# Patient Record
Sex: MALE | Race: WHITE | Employment: OTHER | URBAN - NONMETROPOLITAN AREA
[De-identification: names, ages, dates, MRNs, and addresses within clinical notes are randomized per-mention and may not be internally consistent; named-entity substitution may affect disease eponyms.]

---

## 2017-04-11 ENCOUNTER — OFFICE VISIT (OUTPATIENT)
Dept: GASTROENTEROLOGY | Age: 72
End: 2017-04-11
Payer: MEDICARE

## 2017-04-11 VITALS
SYSTOLIC BLOOD PRESSURE: 132 MMHG | HEIGHT: 68 IN | HEART RATE: 65 BPM | OXYGEN SATURATION: 99 % | WEIGHT: 144.8 LBS | BODY MASS INDEX: 21.95 KG/M2 | DIASTOLIC BLOOD PRESSURE: 84 MMHG

## 2017-04-11 DIAGNOSIS — Z86.010 HISTORY OF ADENOMATOUS POLYP OF COLON: Primary | ICD-10-CM

## 2017-04-11 DIAGNOSIS — Z83.71 FAMILY HISTORY OF COLONIC POLYPS: ICD-10-CM

## 2017-04-11 PROCEDURE — 3017F COLORECTAL CA SCREEN DOC REV: CPT | Performed by: NURSE PRACTITIONER

## 2017-04-11 PROCEDURE — 99214 OFFICE O/P EST MOD 30 MIN: CPT | Performed by: NURSE PRACTITIONER

## 2017-04-11 PROCEDURE — G8419 CALC BMI OUT NRM PARAM NOF/U: HCPCS | Performed by: NURSE PRACTITIONER

## 2017-04-11 PROCEDURE — 1123F ACP DISCUSS/DSCN MKR DOCD: CPT | Performed by: NURSE PRACTITIONER

## 2017-04-11 PROCEDURE — G8427 DOCREV CUR MEDS BY ELIG CLIN: HCPCS | Performed by: NURSE PRACTITIONER

## 2017-04-11 PROCEDURE — 4004F PT TOBACCO SCREEN RCVD TLK: CPT | Performed by: NURSE PRACTITIONER

## 2017-04-11 PROCEDURE — 4040F PNEUMOC VAC/ADMIN/RCVD: CPT | Performed by: NURSE PRACTITIONER

## 2017-04-11 ASSESSMENT — ENCOUNTER SYMPTOMS
DIARRHEA: 0
BACK PAIN: 0
RECTAL PAIN: 0
NAUSEA: 0
SHORTNESS OF BREATH: 0
ABDOMINAL DISTENTION: 0
COUGH: 0
ABDOMINAL PAIN: 0
SORE THROAT: 0
BLOOD IN STOOL: 0
VOICE CHANGE: 0
VOMITING: 0
CONSTIPATION: 0
CHEST TIGHTNESS: 0

## 2017-05-05 ENCOUNTER — ANESTHESIA EVENT (OUTPATIENT)
Dept: OPERATING ROOM | Age: 72
End: 2017-05-05

## 2017-05-09 ENCOUNTER — ANESTHESIA (OUTPATIENT)
Dept: OPERATING ROOM | Age: 72
End: 2017-05-09
Payer: MEDICARE

## 2017-05-09 ENCOUNTER — HOSPITAL ENCOUNTER (OUTPATIENT)
Age: 72
Setting detail: OUTPATIENT SURGERY
Discharge: HOME | End: 2017-05-09
Attending: INTERNAL MEDICINE | Admitting: INTERNAL MEDICINE
Payer: MEDICARE

## 2017-05-09 VITALS — DIASTOLIC BLOOD PRESSURE: 64 MMHG | OXYGEN SATURATION: 99 % | SYSTOLIC BLOOD PRESSURE: 98 MMHG

## 2017-05-09 VITALS
TEMPERATURE: 97 F | OXYGEN SATURATION: 98 % | BODY MASS INDEX: 21.22 KG/M2 | WEIGHT: 140 LBS | RESPIRATION RATE: 18 BRPM | DIASTOLIC BLOOD PRESSURE: 73 MMHG | SYSTOLIC BLOOD PRESSURE: 112 MMHG | HEIGHT: 68 IN | HEART RATE: 70 BPM

## 2017-05-09 PROCEDURE — G0105 COLORECTAL SCRN; HI RISK IND: HCPCS | Performed by: INTERNAL MEDICINE

## 2017-05-09 PROCEDURE — G8907 PT DOC NO EVENTS ON DISCHARG: HCPCS

## 2017-05-09 PROCEDURE — G8918 PT W/O PREOP ORDER IV AB PRO: HCPCS

## 2017-05-09 PROCEDURE — 00810 PR ANESTH,INTESTINE,SCOPE,LOW: CPT | Performed by: NURSE ANESTHETIST, CERTIFIED REGISTERED

## 2017-05-09 PROCEDURE — G0105 COLORECTAL SCRN; HI RISK IND: HCPCS

## 2017-05-09 RX ORDER — LIDOCAINE HYDROCHLORIDE 10 MG/ML
1 INJECTION, SOLUTION EPIDURAL; INFILTRATION; INTRACAUDAL; PERINEURAL
Status: DISCONTINUED | OUTPATIENT
Start: 2017-05-09 | End: 2017-05-09 | Stop reason: HOSPADM

## 2017-05-09 RX ORDER — PROPOFOL 10 MG/ML
INJECTION, EMULSION INTRAVENOUS PRN
Status: DISCONTINUED | OUTPATIENT
Start: 2017-05-09 | End: 2017-05-09 | Stop reason: SDUPTHER

## 2017-05-09 RX ORDER — SODIUM CHLORIDE, SODIUM LACTATE, POTASSIUM CHLORIDE, CALCIUM CHLORIDE 600; 310; 30; 20 MG/100ML; MG/100ML; MG/100ML; MG/100ML
INJECTION, SOLUTION INTRAVENOUS CONTINUOUS PRN
Status: DISCONTINUED | OUTPATIENT
Start: 2017-05-09 | End: 2017-05-09 | Stop reason: SDUPTHER

## 2017-05-09 RX ORDER — SODIUM CHLORIDE, SODIUM LACTATE, POTASSIUM CHLORIDE, CALCIUM CHLORIDE 600; 310; 30; 20 MG/100ML; MG/100ML; MG/100ML; MG/100ML
INJECTION, SOLUTION INTRAVENOUS CONTINUOUS
Status: DISCONTINUED | OUTPATIENT
Start: 2017-05-09 | End: 2017-05-09 | Stop reason: HOSPADM

## 2017-05-09 RX ORDER — LIDOCAINE HYDROCHLORIDE 20 MG/ML
INJECTION, SOLUTION EPIDURAL; INFILTRATION; INTRACAUDAL; PERINEURAL PRN
Status: DISCONTINUED | OUTPATIENT
Start: 2017-05-09 | End: 2017-05-09 | Stop reason: SDUPTHER

## 2017-05-09 RX ADMIN — SODIUM CHLORIDE, SODIUM LACTATE, POTASSIUM CHLORIDE, CALCIUM CHLORIDE: 600; 310; 30; 20 INJECTION, SOLUTION INTRAVENOUS at 08:58

## 2017-05-09 RX ADMIN — PROPOFOL 200 MG: 10 INJECTION, EMULSION INTRAVENOUS at 09:04

## 2017-05-09 RX ADMIN — LIDOCAINE HYDROCHLORIDE 50 MG: 20 INJECTION, SOLUTION EPIDURAL; INFILTRATION; INTRACAUDAL; PERINEURAL at 09:01

## 2017-05-09 RX ADMIN — SODIUM CHLORIDE, SODIUM LACTATE, POTASSIUM CHLORIDE, CALCIUM CHLORIDE: 600; 310; 30; 20 INJECTION, SOLUTION INTRAVENOUS at 08:54

## 2017-06-05 ENCOUNTER — OFFICE VISIT (OUTPATIENT)
Dept: UROLOGY | Facility: CLINIC | Age: 72
End: 2017-06-05

## 2017-06-05 VITALS
HEIGHT: 68 IN | WEIGHT: 143 LBS | DIASTOLIC BLOOD PRESSURE: 82 MMHG | SYSTOLIC BLOOD PRESSURE: 160 MMHG | BODY MASS INDEX: 21.67 KG/M2 | TEMPERATURE: 97.8 F

## 2017-06-05 DIAGNOSIS — N40.1 BENIGN NON-NODULAR PROSTATIC HYPERPLASIA WITH LOWER URINARY TRACT SYMPTOMS: ICD-10-CM

## 2017-06-05 DIAGNOSIS — R97.20 ELEVATED PROSTATE SPECIFIC ANTIGEN (PSA): Primary | ICD-10-CM

## 2017-06-05 LAB
BILIRUB BLD-MCNC: NEGATIVE MG/DL
CLARITY, POC: CLEAR
COLOR UR: YELLOW
GLUCOSE UR STRIP-MCNC: NEGATIVE MG/DL
KETONES UR QL: NEGATIVE
LEUKOCYTE EST, POC: NEGATIVE
NITRITE UR-MCNC: NEGATIVE MG/ML
PH UR: 7 [PH] (ref 5–8)
PROT UR STRIP-MCNC: NEGATIVE MG/DL
RBC # UR STRIP: NEGATIVE /UL
SP GR UR: 1.01 (ref 1–1.03)
UROBILINOGEN UR QL: NORMAL

## 2017-06-05 PROCEDURE — 99214 OFFICE O/P EST MOD 30 MIN: CPT | Performed by: UROLOGY

## 2017-06-05 PROCEDURE — 81003 URINALYSIS AUTO W/O SCOPE: CPT | Performed by: UROLOGY

## 2017-06-05 NOTE — PROGRESS NOTES
"  Mr. Ramirez is 71 y.o. male    CHIEF COMPLAINT: My Psa went back up    HPI  Elevated PSA  The patient presents with a presumed abnormality of the prostate gland, that is an elevated PSA.  This has been found in the context of PSA screening. Severity is best defined by the PSA level of 8.3 ng/ml. This test was done approximately 6days ago. Associated voiding symptom assessment reveals Intermittency. He is undergone a standard 12 core template biopsy that was negative..       Previous PSA values include :   No results found for: PSA    The following portions of the patient's history were reviewed and updated as appropriate: allergies, current medications, past family history, past medical history, past social history, past surgical history and problem list.    Review of Systems   Constitutional: Negative for chills and fever.   Gastrointestinal: Negative for abdominal pain, anal bleeding and blood in stool.   Genitourinary: Negative for difficulty urinating, flank pain, frequency, hematuria and urgency.       No current outpatient prescriptions on file.    Past Medical History:   Diagnosis Date   • Elevated PSA        Past Surgical History:   Procedure Laterality Date   • FOOT SURGERY     • TONSILLECTOMY         Social History     Social History   • Marital status: Unknown     Spouse name: N/A   • Number of children: N/A   • Years of education: N/A     Social History Main Topics   • Smoking status: Never Smoker   • Smokeless tobacco: None   • Alcohol use No   • Drug use: None   • Sexual activity: Not Asked     Other Topics Concern   • None     Social History Narrative   • None       Family History   Problem Relation Age of Onset   • No Known Problems Father    • No Known Problems Mother        /82  Temp 97.8 °F (36.6 °C)  Ht 68\" (172.7 cm)  Wt 143 lb (64.9 kg)  BMI 21.74 kg/m2    Physical Exam  Constitutional: He is oriented to person, place, and time. He appears well-developed and well-nourished. "   Pulmonary/Chest: Effort normal.   Abdominal: Soft. He exhibits no distension and no mass. There is no tenderness. There is no rebound and no guarding. No hernia.   Genitourinary: Penis normal. Rectal exam shows no mass, no tenderness and anal tone normal. Enlarged: for the age of the patient. Right testis shows no mass, no swelling and no tenderness. Left testis shows no mass, no swelling and no tenderness. No hypospadias. No discharge found.   Genitourinary Comments:  The urethral meatus normal in position without evidence of stricture. Epididymis without mass or tenderness. Vas Deferens is palpably normal.Anus and perineum without mass or tenderness. The prostate is approximately 30 ml. It is Symmetric, with a little firm consistency. There are no nodules present. . The seminal vesicles are Palpably normal in size and without mass.   Neurological: He is alert and oriented to person, place, and time.   Vitals reviewed.        Results for orders placed or performed in visit on 06/05/17   POC Urinalysis Dipstick, Automated   Result Value Ref Range    Color Yellow Yellow, Straw, Dark Yellow, Ava    Clarity, UA Clear Clear    Glucose, UA Negative Negative, 1000 mg/dL (3+) mg/dL    Bilirubin Negative Negative    Ketones, UA Negative Negative    Specific Gravity  1.015 1.005 - 1.030    Blood, UA Negative Negative    pH, Urine 7.0 5.0 - 8.0    Protein, POC Negative Negative mg/dL    Urobilinogen, UA Normal Normal    Leukocytes Negative Negative    Nitrite, UA Negative Negative       Imaging Results (last 7 days)     ** No results found for the last 168 hours. **          Assessment and Plan  Gerard was seen today for elevated psa.    Diagnoses and all orders for this visit:    Elevated prostate specific antigen (PSA)  -     POC Urinalysis Dipstick, Automated  -     Cancel: PSA; Future  -     PSA, Total & Free; Future    Benign non-nodular prostatic hyperplasia with lower urinary tract symptoms    This represents an  undiagnosed problem with uncertain prognosis.  I discussed elevated PSA with the patient today.  We discussed that PSA is a protein measured in the bloodstream that comes exclusively from the prostate gland.  I mentioned to him that all men with a prostate gland will have a certain PSA level.  We discussed this number can be compared to all men, or men of a certain age.  We can also follow trend of PSA which is called the PSA velocity.  We discussed the prostate cancer is a possible cause of PSA elevation, but benign etiologies such as infection, enlargement, aging, and inflammation should also be considered.  There is also convincing evidence that some patients will have a PSA that waxes and wanes completely unrelated to symptomatic disease of the prostate for cancer.  We discussed that some patients with a normal PSA may also have prostate cancer.  The necessity of digital rectal exam is also discussed.  Finally we discussed the role of free to total PSA ratio.  It is explained that this test is done in hopes of avoiding unnecessary biopsies, but that a few patients with prostate cancer will have false-negative results when measuring there free PSA.  We have elected to do a PSA with free to total ratio.  We did discuss the natural course of prostate cancer in most patients.  It is explained that waiting to see what the results of this test*are very unlikely to affect the clinical course of the disease.  However, there are exceptions in the biology of each cancer.  The risks and possible benefits of transrectal ultrasound with biopsy of the prostate gland is also discussed.  I did explain that biopsy is the standard of care for diagnosing prostate cancer. The risks, alternatives, and benefits of this treatment recommendation are discussed.  I did answer all questions of the patient.    Valdo Mendez MD  06/05/17  12:44 PM      Cc:

## 2017-09-17 ENCOUNTER — OFFICE VISIT (OUTPATIENT)
Dept: RETAIL CLINIC | Facility: CLINIC | Age: 72
End: 2017-09-17

## 2017-09-17 VITALS
HEART RATE: 73 BPM | WEIGHT: 141.6 LBS | OXYGEN SATURATION: 98 % | TEMPERATURE: 98.2 F | DIASTOLIC BLOOD PRESSURE: 88 MMHG | SYSTOLIC BLOOD PRESSURE: 150 MMHG | BODY MASS INDEX: 21.46 KG/M2 | RESPIRATION RATE: 18 BRPM | HEIGHT: 68 IN

## 2017-09-17 DIAGNOSIS — J30.2 SEASONAL ALLERGIC RHINITIS, UNSPECIFIED ALLERGIC RHINITIS TRIGGER: Primary | ICD-10-CM

## 2017-09-17 DIAGNOSIS — R03.0 ELEVATED BLOOD-PRESSURE READING, WITHOUT DIAGNOSIS OF HYPERTENSION: ICD-10-CM

## 2017-09-17 DIAGNOSIS — R42 DIZZINESS: ICD-10-CM

## 2017-09-17 DIAGNOSIS — H92.02 OTALGIA OF LEFT EAR: ICD-10-CM

## 2017-09-17 PROCEDURE — 99213 OFFICE O/P EST LOW 20 MIN: CPT | Performed by: NURSE PRACTITIONER

## 2017-09-17 RX ORDER — CETIRIZINE HYDROCHLORIDE 10 MG/1
10 TABLET ORAL DAILY PRN
Qty: 30 TABLET | Refills: 0 | Status: SHIPPED | OUTPATIENT
Start: 2017-09-17 | End: 2017-12-06

## 2017-09-17 NOTE — PROGRESS NOTES
"  Chief Complaint   Patient presents with   • Earache     Subjective   Gerard Ramirez is a 71 y.o. male who presents to the clinic today with complaints of his left ear popping and itching at times.  He was treated at the first of the month at [facility] due to left ear infection. He was given a steroid injection, Augmentin 875mg bid x 10 days and Tobrex ear drops.  He has felt better overall, but the last couple of days he has had more popping and at times felt like there was something in his ear.  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 He has noticed a little dizziness today when he turns his head quickly. He has not felt like the room was spinning. He has not had any syncope. He reports, \"I just feel like my balance is a little off.\"  He has a history of allergies. He reports trying Flonase before and thinks he might have had a reaction to it.  His blood pressure is high today and he reports it usually is when he is at an office or clinic. He reports his home systolic readings have been 140-160.   HPI    Current Outpatient Prescriptions:   None reported      Allergies:  Avelox abc [moxifloxacin]; Azithromycin; Latex; and Lipitor [atorvastatin]    Past Medical History:   Diagnosis Date   • Elevated PSA      Past Surgical History:   Procedure Laterality Date   • FOOT SURGERY     • TONSILLECTOMY       Family History   Problem Relation Age of Onset   • No Known Problems Father    • No Known Problems Mother      Social History   Substance Use Topics   • Smoking status: Never Smoker   • Smokeless tobacco: None   • Alcohol use No       Review of Systems  Review of Systems   Constitutional: Negative for chills, fatigue and fever.   HENT: Positive for postnasal drip, sneezing and sore throat (at times). Negative for ear pain (no pain currently).    Eyes: Negative.    Respiratory: Negative for cough, shortness of breath and wheezing.    Allergic/Immunologic: Positive " "for environmental allergies.   Neurological: Positive for dizziness. Negative for syncope, speech difficulty and weakness.       Objective   /88 (BP Location: Left arm, Patient Position: Sitting, Cuff Size: Adult)  Pulse 73  Temp 98.2 °F (36.8 °C) (Oral)   Resp 18  Ht 68\" (172.7 cm)  Wt 141 lb 9.6 oz (64.2 kg)  SpO2 98%  BMI 21.53 kg/m2      Physical Exam   Constitutional: He is oriented to person, place, and time. He appears well-developed and well-nourished. He is cooperative. He does not appear ill. No distress.   HENT:   Head: Normocephalic and atraumatic.   Right Ear: Tympanic membrane, external ear and ear canal normal. Tympanic membrane is not perforated, not erythematous, not retracted and not bulging.   Left Ear: Tympanic membrane, external ear and ear canal normal. Tympanic membrane is not perforated, not erythematous, not retracted and not bulging.   Nose: Nose normal. Right sinus exhibits no maxillary sinus tenderness and no frontal sinus tenderness. Left sinus exhibits no maxillary sinus tenderness and no frontal sinus tenderness.   Mouth/Throat: Uvula is midline and mucous membranes are normal. Posterior oropharyngeal erythema (cobblestoning and clear post nasal drainage) present. Tonsils are 0 on the right. Tonsils are 0 on the left. No tonsillar exudate.   Eyes: Conjunctivae, EOM and lids are normal. Pupils are equal, round, and reactive to light.   Neck: Trachea normal and normal range of motion. Neck supple.   Cardiovascular: Normal rate, regular rhythm, S1 normal, S2 normal and normal heart sounds.    Pulmonary/Chest: Effort normal and breath sounds normal. He has no wheezes. He has no rhonchi. He has no rales.   Lymphadenopathy:     He has no cervical adenopathy.   Neurological: He is alert and oriented to person, place, and time. Coordination and gait normal.   Skin: Skin is warm, dry and intact.   Psychiatric: He has a normal mood and affect. His speech is normal and behavior is " normal.       Assessment/Plan     Gerard was seen today for earache.    Diagnoses and all orders for this visit:    Seasonal allergic rhinitis, unspecified allergic rhinitis trigger    Otalgia of left ear    Dizziness    Elevated blood-pressure reading, without diagnosis of hypertension    Other orders  -     cetirizine (zyrTEC) 10 MG tablet; Take 1 tablet by mouth Daily As Needed for Allergies.      Take Zyrtec as discussed.  You report mild dizziness/lightheadedness. If this persists or worsens please follow up with Dr. Hill or urgent care.  Your blood pressure was elevated today. Please take and record home readings and follow up with Dr. Hill or another primary care provider.

## 2017-09-17 NOTE — PATIENT INSTRUCTIONS
Nasal Allergies  Nasal allergies are a reaction to allergens in the air. Allergens are particles in the air that cause your body to have an allergic reaction. Nasal allergies are not passed from person to person (are not contagious). They cannot be cured, but they can be controlled.  CAUSES  Seasonal nasal allergies (hay fever) are caused by pollen allergens that come from grasses, trees, and weeds. Year-round nasal allergies (perennial allergic rhinitis) are caused by allergens such as house dust mites, pet dander, and mold spores.  RISK FACTORS  The following factors may make you more likely to develop this condition:  · Having certain health conditions. These include:    Other types of allergies, such as food allergies.    Asthma.    Eczema.  · Having a close relative who has allergies or asthma.  · Exposure to house dust, pollen, dander, or other allergens at home or at work.  · Exposure to air pollution or secondhand smoke when you were a child.  SYMPTOMS  Symptoms of this condition include:  · Sneezing.  · Runny nose or stuffy nose (congestion).  · Watery (tearing) eyes.  · Itchy eyes, nose, mouth, throat, skin, or other area.  · Sore throat.  · Headache.  · Decreased sense of smell or taste.  · Fatigue. This may occur if you have trouble sleeping due to allergies.  · Swollen eyelids.  DIAGNOSIS  This condition is diagnosed with a medical history and physical exam. Allergy testing may be done to determine exactly what triggers your nasal allergies.  TREATMENT  There is no cure for nasal allergies. Treatment focuses on controlling your symptoms, and it may include:  · Medicines that block allergy symptoms. These may include allergy shots, nasal sprays, and oral antihistamines.  · Avoiding the allergen.  HOME CARE INSTRUCTIONS  · Avoid the allergen that is causing your symptoms, if possible.  · Keep windows closed. If possible, use air conditioning when pollen counts are high.  · Do not use fans in your  home.  · Do not hang clothes outside to dry.  · Wear sunglasses to keep pollen out of your eyes.  · Wash your hands right away after you touch household pets.  · Take over-the-counter and prescription medicines only as told by your health care provider.  · Keep all follow-up visits as told by your health care provider. This is important.  SEEK MEDICAL CARE IF:  · You have a fever.  · You develop a cough that does not go away (is persistent).  · You start to wheeze.  · Your symptoms do not improve with treatment.  · You have thick nasal discharge.  · You start to have nosebleeds.  SEEK IMMEDIATE MEDICAL CARE IF:  · Your tongue or your lips are swollen.  · You have trouble breathing.  · You feel light-headed or you feel like you are going to faint.  · You have cold sweats.     This information is not intended to replace advice given to you by your health care provider. Make sure you discuss any questions you have with your health care provider.     Document Released: 12/18/2006 Document Revised: 04/10/2017 Document Reviewed: 06/29/2016  [employer] Interactive Patient Education ©2017 [employer]      Earache  An earache, also called otalgia, can be caused by many things. Pain from an earache can be sharp, dull, or burning. The pain may be temporary or constant.  Earaches can be caused by problems with the ear, such as infection in either the middle ear or the ear canal, injury, impacted ear wax, middle ear pressure, or a foreign body in the ear. Ear pain can also result from problems in other areas. This is called referred pain. For example, pain can come from a sore throat, a tooth infection, or problems with the jaw or the joint between the jaw and the skull (temporomandibular joint, or TMJ).  The cause of an earache is not always easy to identify. Watchful waiting may be appropriate for some earaches until a clear cause of the pain can be found.  HOME CARE INSTRUCTIONS  Watch your condition for any changes. The  following actions may help to lessen any discomfort that you are feeling:  · Take medicines only as directed by your health care provider. This includes ear drops.  · Apply ice to your outer ear to help reduce pain.    Put ice in a plastic bag.    Place a towel between your skin and the bag.    Leave the ice on for 20 minutes, 2-3 times per day.  · Do not put anything in your ear other than medicine that is prescribed by your health care provider.  · Try resting in an upright position instead of lying down. This may help to reduce pressure in the middle ear and relieve pain.  · Chew gum if it helps to relieve your ear pain.  · Control any allergies that you have.  · Keep all follow-up visits as directed by your health care provider. This is important.  SEEK MEDICAL CARE IF:  · Your pain does not improve within 2 days.  · You have a fever.  · You have new or worsening symptoms.  SEEK IMMEDIATE MEDICAL CARE IF:  · You have a severe headache.  · You have a stiff neck.  · You have difficulty swallowing.  · You have redness or swelling behind your ear.  · You have drainage from your ear.  · You have hearing loss.  · You feel dizzy.     This information is not intended to replace advice given to you by your health care provider. Make sure you discuss any questions you have with your health care provider.     Document Released: 08/04/2005 Document Revised: 01/08/2016 Document Reviewed: 07/19/2015  [employer] Interactive Patient Education ©2017 [employer]    Cetirizine tablets  What is this medicine?  CETIRIZINE (se TI ra zeen) is an antihistamine. This medicine is used to treat or prevent symptoms of allergies. It is also used to help reduce itchy skin rash and hives.  This medicine may be used for other purposes; ask your health care provider or pharmacist if you have questions.  COMMON BRAND NAME(S): All Day Allergy, Zyrtec, Zyrtec Hives Relief  What should I tell my health care provider before I take this  medicine?  They need to know if you have any of these conditions:  -kidney disease  -liver disease  -an unusual or allergic reaction to cetirizine, hydroxyzine, other medicines, foods, dyes, or preservatives  -pregnant or trying to get pregnant  -breast-feeding  How should I use this medicine?  Take this medicine by mouth with a glass of water. Follow the directions on the prescription label. You can take this medicine with food or on an empty stomach. Take your medicine at regular times. Do not take more often than directed. You may need to take this medicine for several days before your symptoms improve.  Talk to your pediatrician regarding the use of this medicine in children. Special care may be needed. While this drug may be prescribed for children as young as 6 years of age for selected conditions, precautions do apply.  Overdosage: If you think you have taken too much of this medicine contact a poison control center or emergency room at once.  NOTE: This medicine is only for you. Do not share this medicine with others.  What if I miss a dose?  If you miss a dose, take it as soon as you can. If it is almost time for your next dose, take only that dose. Do not take double or extra doses.  What may interact with this medicine?  -alcohol  -certain medicines for anxiety or sleep  -narcotic medicines for pain  -other medicines for colds or allergies  This list may not describe all possible interactions. Give your health care provider a list of all the medicines, herbs, non-prescription drugs, or dietary supplements you use. Also tell them if you smoke, drink alcohol, or use illegal drugs. Some items may interact with your medicine.  What should I watch for while using this medicine?  Visit your doctor or health care professional for regular checks on your health. Tell your doctor if your symptoms do not improve.  You may get drowsy or dizzy. Do not drive, use machinery, or do anything that needs mental alertness  until you know how this medicine affects you. Do not stand or sit up quickly, especially if you are an older patient. This reduces the risk of dizzy or fainting spells.  Your mouth may get dry. Chewing sugarless gum or sucking hard candy, and drinking plenty of water may help. Contact your doctor if the problem does not go away or is severe.  What side effects may I notice from receiving this medicine?  Side effects that you should report to your doctor or health care professional as soon as possible:  -allergic reactions like skin rash, itching or hives, swelling of the face, lips, or tongue  -changes in vision or hearing  -fast or irregular heartbeat  -trouble passing urine or change in the amount of urine  Side effects that usually do not require medical attention (report to your doctor or health care professional if they continue or are bothersome):  -dizziness  -dry mouth  -irritability  -sore throat  -stomach pain  -tiredness  This list may not describe all possible side effects. Call your doctor for medical advice about side effects. You may report side effects to FDA at 6-777-FDA-0255.  Where should I keep my medicine?  Keep out of the reach of children.  Store at room temperature between 15 and 30 degrees C (59 and 86 degrees F). Throw away any unused medicine after the expiration date.  NOTE: This sheet is a summary. It may not cover all possible information. If you have questions about this medicine, talk to your doctor, pharmacist, or health care provider.     © 2017, [employer]. (2016-01-12 13:44:42)    Dizziness  Dizziness is a common problem. It is a feeling of unsteadiness or light-headedness. You may feel like you are about to faint. Dizziness can lead to injury if you stumble or fall. Anyone can become dizzy, but dizziness is more common in older adults. This condition can be caused by a number of things, including medicines, dehydration, or illness.  HOME CARE INSTRUCTIONS  Taking  these steps may help with your condition:  Eating and Drinking  · Drink enough fluid to keep your urine clear or pale yellow. This helps to keep you from becoming dehydrated. Try to drink more clear fluids, such as water.  · Do not drink alcohol.  · Limit your caffeine intake if directed by your health care provider.  · Limit your salt intake if directed by your health care provider.  Activity  · Avoid making quick movements.    Rise slowly from chairs and steady yourself until you feel okay.    In the morning, first sit up on the side of the bed. When you feel okay, stand slowly while you hold onto something until you know that your balance is fine.  · Move your legs often if you need to  one place for a long time. Tighten and relax your muscles in your legs while you are standing.  · Do not drive or operate heavy machinery if you feel dizzy.  · Avoid bending down if you feel dizzy. Place items in your home so that they are easy for you to reach without leaning over.  Lifestyle  · Do not use any tobacco products, including cigarettes, chewing tobacco, or electronic cigarettes. If you need help quitting, ask your health care provider.  · Try to reduce your stress level, such as with yoga or meditation. Talk with your health care provider if you need help.  General Instructions  · Watch your dizziness for any changes.  · Take medicines only as directed by your health care provider. Talk with your health care provider if you think that your dizziness is caused by a medicine that you are taking.  · Tell a friend or a family member that you are feeling dizzy. If he or she notices any changes in your behavior, have this person call your health care provider.  · Keep all follow-up visits as directed by your health care provider. This is important.  SEEK MEDICAL CARE IF:  · Your dizziness does not go away.  · Your dizziness or light-headedness gets worse.  · You feel nauseous.  · You have reduced hearing.  · You  have new symptoms.  · You are unsteady on your feet or you feel like the room is spinning.  SEEK IMMEDIATE MEDICAL CARE IF:  · You vomit or have diarrhea and are unable to eat or drink anything.  · You have problems talking, walking, swallowing, or using your arms, hands, or legs.  · You feel generally weak.  · You are not thinking clearly or you have trouble forming sentences. It may take a friend or family member to notice this.  · You have chest pain, abdominal pain, shortness of breath, or sweating.  · Your vision changes.  · You notice any bleeding.  · You have a headache.  · You have neck pain or a stiff neck.  · You have a fever.     This information is not intended to replace advice given to you by your health care provider. Make sure you discuss any questions you have with your health care provider.     Document Released: 06/13/2002 Document Revised: 05/03/2016 Document Reviewed: 12/14/2015  [email or web address] Interactive Patient Education ©2017 [email or web address]    Take Zyrtec as discussed.  You report mild dizziness/lightheadedness. If this persists or worsens please follow up with Dr. Hill or urgent care.  Your blood pressure was elevated today. Please take and record home readings and follow up with Dr. Hill or another primary care provider.

## 2017-10-12 ENCOUNTER — OFFICE VISIT (OUTPATIENT)
Dept: RETAIL CLINIC | Facility: CLINIC | Age: 72
End: 2017-10-12

## 2017-10-12 VITALS — HEART RATE: 82 BPM | RESPIRATION RATE: 20 BRPM | OXYGEN SATURATION: 98 % | TEMPERATURE: 98.4 F

## 2017-10-12 DIAGNOSIS — Z23 NEEDS FLU SHOT: Primary | ICD-10-CM

## 2017-11-27 DIAGNOSIS — R97.20 ELEVATED PROSTATE SPECIFIC ANTIGEN (PSA): ICD-10-CM

## 2017-11-28 LAB
PSA FREE MFR SERPL: 19.8 %
PSA FREE SERPL-MCNC: 0.85 NG/ML
PSA SERPL-MCNC: 4.3 NG/ML (ref 0–4)

## 2017-12-06 ENCOUNTER — OFFICE VISIT (OUTPATIENT)
Dept: UROLOGY | Facility: CLINIC | Age: 72
End: 2017-12-06

## 2017-12-06 VITALS — TEMPERATURE: 97.6 F | HEIGHT: 68 IN | WEIGHT: 152 LBS | BODY MASS INDEX: 23.04 KG/M2

## 2017-12-06 DIAGNOSIS — R97.20 ELEVATED PSA: Primary | ICD-10-CM

## 2017-12-06 DIAGNOSIS — N40.1 BENIGN NON-NODULAR PROSTATIC HYPERPLASIA WITH LOWER URINARY TRACT SYMPTOMS: ICD-10-CM

## 2017-12-06 LAB
BILIRUB BLD-MCNC: NEGATIVE MG/DL
CLARITY, POC: CLEAR
COLOR UR: YELLOW
GLUCOSE UR STRIP-MCNC: NEGATIVE MG/DL
KETONES UR QL: NEGATIVE
LEUKOCYTE EST, POC: NEGATIVE
NITRITE UR-MCNC: NEGATIVE MG/ML
PH UR: 5.5 [PH] (ref 5–8)
PROT UR STRIP-MCNC: NEGATIVE MG/DL
RBC # UR STRIP: NEGATIVE /UL
SP GR UR: 1.01 (ref 1–1.03)
UROBILINOGEN UR QL: NORMAL

## 2017-12-06 PROCEDURE — 81003 URINALYSIS AUTO W/O SCOPE: CPT | Performed by: UROLOGY

## 2017-12-06 PROCEDURE — 99213 OFFICE O/P EST LOW 20 MIN: CPT | Performed by: UROLOGY

## 2017-12-06 NOTE — PROGRESS NOTES
"  Mr. Ramirez is 72 y.o. male    CHIEF COMPLAINT: I am here to follow up on my BPH.     HPI  Follow-up elevated PSA as well as BPH  Location: Prostate  Quality:  Elevated blood test.  Severity: Moderate  Duration: Initially noted 4 months ago.  Had PSA drawn last week.  Context: Found on PSA screening and evaluation of voiding symptoms.  Modifying factors: Improves with watchful waiting on both counts  Associated signs or symptoms: Urgency frequency and some decreased force urine stream      Lab Results   Component Value Date    PSA 4.3 (H) 11/27/2017   Free Percentage 19.8 %      The following portions of the patient's history were reviewed and updated as appropriate: allergies, current medications, past family history, past medical history, past social history, past surgical history and problem list.    Review of Systems   Constitutional: Negative for chills and fever.   Gastrointestinal: Negative for abdominal pain, anal bleeding and blood in stool.   Genitourinary: Negative for flank pain and hematuria.       No current outpatient prescriptions on file.    Past Medical History:   Diagnosis Date   • Elevated PSA        Past Surgical History:   Procedure Laterality Date   • FOOT SURGERY     • TONSILLECTOMY         Social History     Social History   • Marital status: Unknown     Spouse name: N/A   • Number of children: N/A   • Years of education: N/A     Social History Main Topics   • Smoking status: Never Smoker   • Smokeless tobacco: Current User     Types: Chew   • Alcohol use No   • Drug use: None   • Sexual activity: Not Asked     Other Topics Concern   • None     Social History Narrative       Family History   Problem Relation Age of Onset   • No Known Problems Father    • No Known Problems Mother        Temp 97.6 °F (36.4 °C)  Ht 172.7 cm (68\")  Wt 68.9 kg (152 lb)  BMI 23.11 kg/m2    Physical Exam  Alert and oriented ×3  Not agitated or distressed  No obvious deformities  No respiratory distress  Skin " without pallor or diaphoresis      Results for orders placed or performed in visit on 12/06/17   POC Urinalysis Dipstick, Automated   Result Value Ref Range    Color Yellow Yellow, Straw, Dark Yellow, Ava    Clarity, UA Clear Clear    Glucose, UA Negative Negative, 1000 mg/dL (3+) mg/dL    Bilirubin Negative Negative    Ketones, UA Negative Negative    Specific Gravity  1.015 1.005 - 1.030    Blood, UA Negative Negative    pH, Urine 5.5 5.0 - 8.0    Protein, POC Negative Negative mg/dL    Urobilinogen, UA Normal Normal    Leukocytes Negative Negative    Nitrite, UA Negative Negative       Imaging Results (last 7 days)     ** No results found for the last 168 hours. **          Assessment and Plan  Diagnoses and all orders for this visit:    Elevated PSA  -     POC Urinalysis Dipstick, Automated  -     PSA; Future    Benign non-nodular prostatic hyperplasia with lower urinary tract symptoms    #1.  His voiding symptoms have improved considerably.  #2.  His PSA is also improved with watchful waiting.  Although it is still elevated and will warrant further follow-up, I think it is likely that the PSA elevation is due to benign prostatic hyperplasia.      Valdo Mendez MD  12/06/17  11:35 AM      Cc: Soto Hill MD

## 2017-12-14 ENCOUNTER — OFFICE VISIT (OUTPATIENT)
Dept: RETAIL CLINIC | Facility: CLINIC | Age: 72
End: 2017-12-14

## 2017-12-14 VITALS
SYSTOLIC BLOOD PRESSURE: 154 MMHG | DIASTOLIC BLOOD PRESSURE: 88 MMHG | HEART RATE: 82 BPM | HEIGHT: 68 IN | OXYGEN SATURATION: 98 % | TEMPERATURE: 98.2 F | RESPIRATION RATE: 18 BRPM | WEIGHT: 148.8 LBS | BODY MASS INDEX: 22.55 KG/M2

## 2017-12-14 DIAGNOSIS — J01.40 ACUTE NON-RECURRENT PANSINUSITIS: ICD-10-CM

## 2017-12-14 DIAGNOSIS — J30.2 SEASONAL ALLERGIC RHINITIS, UNSPECIFIED CHRONICITY, UNSPECIFIED TRIGGER: Primary | ICD-10-CM

## 2017-12-14 PROCEDURE — 96372 THER/PROPH/DIAG INJ SC/IM: CPT | Performed by: NURSE PRACTITIONER

## 2017-12-14 PROCEDURE — 99213 OFFICE O/P EST LOW 20 MIN: CPT | Performed by: NURSE PRACTITIONER

## 2017-12-14 RX ORDER — METHYLPREDNISOLONE ACETATE 80 MG/ML
80 INJECTION, SUSPENSION INTRA-ARTICULAR; INTRALESIONAL; INTRAMUSCULAR; SOFT TISSUE ONCE
Status: COMPLETED | OUTPATIENT
Start: 2017-12-14 | End: 2017-12-14

## 2017-12-14 RX ORDER — AMOXICILLIN AND CLAVULANATE POTASSIUM 875; 125 MG/1; MG/1
1 TABLET, FILM COATED ORAL 2 TIMES DAILY
Qty: 14 TABLET | Refills: 0
Start: 2017-12-14 | End: 2017-12-21

## 2017-12-14 RX ADMIN — METHYLPREDNISOLONE ACETATE 80 MG: 80 INJECTION, SUSPENSION INTRA-ARTICULAR; INTRALESIONAL; INTRAMUSCULAR; SOFT TISSUE at 13:14

## 2017-12-14 NOTE — PATIENT INSTRUCTIONS
Allergic Rhinitis  Allergic rhinitis is when the mucous membranes in the nose respond to allergens. Allergens are particles in the air that cause your body to have an allergic reaction. This causes you to release allergic antibodies. Through a chain of events, these eventually cause you to release histamine into the blood stream. Although meant to protect the body, it is this release of histamine that causes your discomfort, such as frequent sneezing, congestion, and an itchy, runny nose.   CAUSES  Seasonal allergic rhinitis (hay fever) is caused by pollen allergens that may come from grasses, trees, and weeds. Year-round allergic rhinitis (perennial allergic rhinitis) is caused by allergens such as house dust mites, pet dander, and mold spores.  SYMPTOMS  · Nasal stuffiness (congestion).  · Itchy, runny nose with sneezing and tearing of the eyes.  DIAGNOSIS  Your health care provider can help you determine the allergen or allergens that trigger your symptoms. If you and your health care provider are unable to determine the allergen, skin or blood testing may be used. Your health care provider will diagnose your condition after taking your health history and performing a physical exam. Your health care provider may assess you for other related conditions, such as asthma, pink eye, or an ear infection.  TREATMENT  Allergic rhinitis does not have a cure, but it can be controlled by:  · Medicines that block allergy symptoms. These may include allergy shots, nasal sprays, and oral antihistamines.  · Avoiding the allergen.  Hay fever may often be treated with antihistamines in pill or nasal spray forms. Antihistamines block the effects of histamine. There are over-the-counter medicines that may help with nasal congestion and swelling around the eyes. Check with your health care provider before taking or giving this medicine.  If avoiding the allergen or the medicine prescribed do not work, there are many new medicines  your health care provider can prescribe. Stronger medicine may be used if initial measures are ineffective. Desensitizing injections can be used if medicine and avoidance does not work. Desensitization is when a patient is given ongoing shots until the body becomes less sensitive to the allergen. Make sure you follow up with your health care provider if problems continue.  HOME CARE INSTRUCTIONS  It is not possible to completely avoid allergens, but you can reduce your symptoms by taking steps to limit your exposure to them. It helps to know exactly what you are allergic to so that you can avoid your specific triggers.  SEEK MEDICAL CARE IF:  · You have a fever.  · You develop a cough that does not stop easily (persistent).  · You have shortness of breath.  · You start wheezing.  · Symptoms interfere with normal daily activities.     This information is not intended to replace advice given to you by your health care provider. Make sure you discuss any questions you have with your health care provider.     Document Released: 09/12/2002 Document Revised: 01/08/2016 Document Reviewed: 08/25/2014  [employer] Interactive Patient Education ©2017 [employer]      Sinusitis, Adult  Sinusitis is soreness and inflammation of your sinuses. Sinuses are hollow spaces in the bones around your face. Your sinuses are located:  · Around your eyes.  · In the middle of your forehead.  · Behind your nose.  · In your cheekbones.  Your sinuses and nasal passages are lined with a stringy fluid (mucus). Mucus normally drains out of your sinuses. When your nasal tissues become inflamed or swollen, the mucus can become trapped or blocked so air cannot flow through your sinuses. This allows bacteria, viruses, and funguses to grow, which leads to infection.  Sinusitis can develop quickly and last for 7-10 days (acute) or for more than 12 weeks (chronic). Sinusitis often develops after a cold.  CAUSES  This condition is caused by anything that  creates swelling in the sinuses or stops mucus from draining, including:  · Allergies.  · Asthma.  · Bacterial or viral infection.  · Abnormally shaped bones between the nasal passages.  · Nasal growths that contain mucus (nasal polyps).  · Narrow sinus openings.  · Pollutants, such as chemicals or irritants in the air.  · A foreign object stuck in the nose.  · A fungal infection. This is rare.  RISK FACTORS  The following factors may make you more likely to develop this condition:  · Having allergies or asthma.  · Having had a recent cold or respiratory tract infection.  · Having structural deformities or blockages in your nose or sinuses.  · Having a weak immune system.  · Doing a lot of swimming or diving.  · Overusing nasal sprays.  · Smoking.  SYMPTOMS  The main symptoms of this condition are pain and a feeling of pressure around the affected sinuses. Other symptoms include:  · Upper toothache.  · Earache.  · Headache.  · Bad breath.  · Decreased sense of smell and taste.  · A cough that may get worse at night.  · Fatigue.  · Fever.  · Thick drainage from your nose. The drainage is often green and it may contain pus (purulent).  · Stuffy nose or congestion.  · Postnasal drip. This is when extra mucus collects in the throat or back of the nose.  · Swelling and warmth over the affected sinuses.  · Sore throat.  · Sensitivity to light.  DIAGNOSIS  This condition is diagnosed based on symptoms, a medical history, and a physical exam. To find out if your condition is acute or chronic, your health care provider may:  · Look in your nose for signs of nasal polyps.  · Tap over the affected sinus to check for signs of infection.  · View the inside of your sinuses using an imaging device that has a light attached (endoscope).  If your health care provider suspects that you have chronic sinusitis, you may also:  · Be tested for allergies.  · Have a sample of mucus taken from your nose (nasal culture) and checked for  bacteria.  · Have a mucus sample examined to see if your sinusitis is related to an allergy.  If your sinusitis does not respond to treatment and it lasts longer than 8 weeks, you may have an MRI or CT scan to check your sinuses. These scans also help to determine how severe your infection is.  In rare cases, a bone biopsy may be done to rule out more serious types of fungal sinus disease.  TREATMENT  Treatment for sinusitis depends on the cause and whether your condition is chronic or acute. If a virus is causing your sinusitis, your symptoms will go away on their own within 10 days. You may be given medicines to relieve your symptoms, including:  · Topical nasal decongestants. They shrink swollen nasal passages and let mucus drain from your sinuses.  · Antihistamines. These drugs block inflammation that is triggered by allergies. This can help to ease swelling in your nose and sinuses.  · Topical nasal corticosteroids. These are nasal sprays that ease inflammation and swelling in your nose and sinuses.  · Nasal saline washes. These rinses can help to get rid of thick mucus in your nose.  If your condition is caused by bacteria, you will be given an antibiotic medicine. If your condition is caused by a fungus, you will be given an antifungal medicine.  Surgery may be needed to correct underlying conditions, such as narrow nasal passages. Surgery may also be needed to remove polyps.  HOME CARE INSTRUCTIONS  Medicines  · Take, use, or apply over-the-counter and prescription medicines only as told by your health care provider. These may include nasal sprays.  · If you were prescribed an antibiotic medicine, take it as told by your health care provider. Do not stop taking the antibiotic even if you start to feel better.  Hydrate and Humidify  · Drink enough water to keep your urine clear or pale yellow. Staying hydrated will help to thin your mucus.  · Use a cool mist humidifier to keep the humidity level in your home  above 50%.  · Inhale steam for 10-15 minutes, 3-4 times a day or as told by your health care provider. You can do this in the bathroom while a hot shower is running.  · Limit your exposure to cool or dry air.  Rest  · Rest as much as possible.  · Sleep with your head raised (elevated).  · Make sure to get enough sleep each night.  General Instructions  · Apply a warm, moist washcloth to your face 3-4 times a day or as told by your health care provider. This will help with discomfort.  · Wash your hands often with soap and water to reduce your exposure to viruses and other germs. If soap and water are not available, use hand .  · Do not smoke. Avoid being around people who are smoking (secondhand smoke).  · Keep all follow-up visits as told by your health care provider. This is important.  SEEK MEDICAL CARE IF:  · You have a fever.  · Your symptoms get worse.  · Your symptoms do not improve within 10 days.  SEEK IMMEDIATE MEDICAL CARE IF:  · You have a severe headache.  · You have persistent vomiting.  · You have pain or swelling around your face or eyes.  · You have vision problems.  · You develop confusion.  · Your neck is stiff.  · You have trouble breathing.     This information is not intended to replace advice given to you by your health care provider. Make sure you discuss any questions you have with your health care provider.     Document Released: 12/18/2006 Document Revised: 04/10/2017 Document Reviewed: 10/12/2016  [employer] Interactive Patient Education ©2017 [employer]    Methylprednisolone Suspension for Injection  What is this medicine?  METHYLPREDNISOLONE (meth ill pred NISS oh lone) is a corticosteroid. It is commonly used to treat inflammation of the skin, joints, lungs, and other organs. Common conditions treated include asthma, allergies, and arthritis. It is also used for other conditions, such as blood disorders and diseases of the adrenal glands.  This medicine may be used for other  purposes; ask your health care provider or pharmacist if you have questions.  COMMON BRAND NAME(S): Depmedalone-40, Depmedalone-80, Depo-Medrol  What should I tell my health care provider before I take this medicine?  They need to know if you have any of these conditions:  -Cushing's syndrome  -eye disease, vision problems  -diabetes  -glaucoma  -heart disease  -high blood pressure  -infection (especially a virus infection such as chickenpox, cold sores, or herpes)  -liver disease  -mental illness  -myasthenia gravis  -osteoporosis  -recently received or scheduled to receive a vaccine  -seizures  -stomach or intestine problems  -thyroid disease  -an unusual or allergic reaction to lactose, methylprednisolone, other medicines, foods, dyes, or preservatives  -pregnant or trying to get pregnant  -breast-feeding  How should I use this medicine?  This medicine is for injection into a muscle, joint, or other tissue. It is given by a health care professional in a hospital or clinic setting.  Talk to your pediatrician regarding the use of this medicine in children. While this drug may be prescribed for selected conditions, precautions do apply.  Overdosage: If you think you have taken too much of this medicine contact a poison control center or emergency room at once.  NOTE: This medicine is only for you. Do not share this medicine with others.  What if I miss a dose?  This does not apply.  What may interact with this medicine?  Do not take this medicine with any of the following medications:  -alefacept  -echinacea  -iopamidol  -live virus vaccines  -metyrapone  -mifepristone  This medicine may also interact with the following medications:  -amphotericin B  -aspirin and aspirin-like medicines  -certain antibiotics like erythromycin, clarithromycin, troleandomycin  -certain medicines for diabetes  -certain medicines for fungal infections like ketoconazole  -certain medicines for seizures like carbamazepine, phenobarbital,  phenytoin  -certain medicines that treat or prevent blood clots like warfarin  -cyclosporine  -digoxin  -diuretics  -female hormones, like estrogens and birth control pills  -isoniazid  -NSAIDs, medicines for pain inflammation, like ibuprofen or naproxen  -other medicines for myasthenia gravis  -rifampin  -vaccines  This list may not describe all possible interactions. Give your health care provider a list of all the medicines, herbs, non-prescription drugs, or dietary supplements you use. Also tell them if you smoke, drink alcohol, or use illegal drugs. Some items may interact with your medicine.  What should I watch for while using this medicine?  Tell your doctor or healthcare professional if your symptoms do not start to get better or if they get worse. Do not stop taking except on your doctor's advice. You may develop a severe reaction. Your doctor will tell you how much medicine to take.  Your condition will be monitored carefully while you are receiving this medicine.  This medicine may increase your risk of getting an infection. Tell your doctor or health care professional if you are around anyone with measles or chickenpox, or if you develop sores or blisters that do not heal properly.  This medicine may affect blood sugar levels. If you have diabetes, check with your doctor or health care professional before you change your diet or the dose of your diabetic medicine.  Tell your doctor or health care professional right away if you have any change in your eyesight.  Using this medicine for a long time may increase your risk of low bone mass. Talk to your doctor about bone health.  What side effects may I notice from receiving this medicine?  Side effects that you should report to your doctor or health care professional as soon as possible:  -allergic reactions like skin rash, itching or hives, swelling of the face, lips, or tongue  -bloody or tarry stools  -changes in vision  -hallucination, loss of contact  with reality  -muscle cramps  -muscle pain  -palpitations  -signs and symptoms of high blood sugar such as dizziness; dry mouth; dry skin; fruity breath; nausea; stomach pain; increased hunger or thirst; increased urination  -signs and symptoms of infection like fever or chills; cough; sore throat; pain or trouble passing urine  -trouble passing urine or change in the amount of urine  Side effects that usually do not require medical attention (report to your doctor or health care professional if they continue or are bothersome):  -changes in emotions or mood  -constipation  -diarrhea  -excessive hair growth on the face or body  -headache  -nausea, vomiting  -pain, redness, or irritation at site where injected  -trouble sleeping  -weight gain  This list may not describe all possible side effects. Call your doctor for medical advice about side effects. You may report side effects to FDA at 2-006-FDA-0383.  Where should I keep my medicine?  This drug is given in a hospital or clinic and will not be stored at home.  NOTE: This sheet is a summary. It may not cover all possible information. If you have questions about this medicine, talk to your doctor, pharmacist, or health care provider.     © 2017, [employer]. (2017-02-23 16:17:02)    Risks and benefits of steroid injection discussed prior to administration.  Use saline nasal spray as needed to rinse nasal passages.  Avoid allergens when possible. We have discussed that a lot of times sinus problems are due to allergies or viruses and antibiotics do not help.      You have agreed to follow instructions as discussed for self-care at home.  If your symptoms do not improve or if an increase in symptoms occurs over next 48-72 hours, you are to start the antibiotic.  If you are significantly worse you will need to be seen by a healthcare provider.     If you improve please shred the prescription and throw it away.  Do not save it for another illness.   Antibiotics are medications that need to be taken as directed, for the length of time directed and only if prescribed for a specific illness by a healthcare provider.  Antibiotics fight bacterial infections only. They will not be effective in viral illnesses. Exposure to an antibiotic unnecessarily can make them ineffective when you need them in the future. You verbalized understanding of these instructions and agreed to follow them.    Please monitor your blood pressure at home. Please notify Dr. Hill if you have high readings (consistently >160/90).      If symptoms persist or worsen please follow up with Dr. Hill or go to urgent care.

## 2017-12-14 NOTE — PROGRESS NOTES
Chief Complaint   Patient presents with   • Sinus Problem     Subjective   Gerard Ramirez is a 72 y.o. male who presents to the clinic today with complaints of sinus problem.  He has a history of allergies. He is unable to take antihistamines and decongestants due to a history of prostate issues.  He is intolerant to steroid nasal sprays. He reports usually when his symptoms are this bad he gets a steroid shot or a pack which he tolerates well. He prefers the shot.   Sinus Problem   This is a new problem. The current episode started 1 to 4 weeks ago (about two weeks). There has been no fever. Associated symptoms include coughing, sinus pressure, sneezing and a sore throat (scratchy at times). Pertinent negatives include no chills, congestion (occasional due to a tickle in his throat), ear pain, headaches or shortness of breath. Treatments tried: throat lozenge.     Current Outpatient Prescriptions:   None reported      Allergies:  Avelox abc [moxifloxacin]; Azithromycin; Latex; and Lipitor [atorvastatin]    Past Medical History:   Diagnosis Date   • Elevated PSA      Past Surgical History:   Procedure Laterality Date   • FOOT SURGERY     • TONSILLECTOMY       Family History   Problem Relation Age of Onset   • No Known Problems Father    • No Known Problems Mother      Social History   Substance Use Topics   • Smoking status: Never Smoker   • Smokeless tobacco: Current User     Types: Chew   • Alcohol use No       Review of Systems  Review of Systems   Constitutional: Negative for chills, fatigue and fever.   HENT: Positive for postnasal drip, rhinorrhea (clear), sinus pressure, sneezing and sore throat (scratchy at times). Negative for congestion (occasional due to a tickle in his throat), ear pain, facial swelling and trouble swallowing.    Eyes: Positive for discharge (watery at times) and itching. Negative for photophobia, pain, redness and visual disturbance.   Respiratory: Positive for cough. Negative for  "chest tightness, shortness of breath and wheezing.    Cardiovascular: Negative for chest pain.   Neurological: Negative for headaches.       Objective   /88 (BP Location: Left arm, Patient Position: Sitting, Cuff Size: Adult)  Pulse 82  Temp 98.2 °F (36.8 °C) (Oral)   Resp 18  Ht 172.7 cm (68\")  Wt 67.5 kg (148 lb 12.8 oz)  SpO2 98%  BMI 22.62 kg/m2      Physical Exam   Constitutional: He is oriented to person, place, and time. He appears well-developed and well-nourished. He is cooperative. He does not appear ill. No distress.   HENT:   Head: Normocephalic and atraumatic.   Right Ear: Tympanic membrane, external ear and ear canal normal. Tympanic membrane is not perforated, not erythematous, not retracted and not bulging.   Left Ear: Tympanic membrane, external ear and ear canal normal. Tympanic membrane is not perforated, not erythematous, not retracted and not bulging.   Nose: Sinus tenderness (he does have pressure and fullness of frontal and maxillary sinus regions) present.   Mouth/Throat: Uvula is midline and mucous membranes are normal. Posterior oropharyngeal erythema (minimal erythema, cobblestoning and clear post nasal drainage noted) present. Tonsils are 0 on the right. Tonsils are 0 on the left.   Eyes: Conjunctivae, EOM and lids are normal. Pupils are equal, round, and reactive to light.   Neck: Trachea normal and normal range of motion. Neck supple.   Cardiovascular: Normal rate, regular rhythm, S1 normal, S2 normal and normal heart sounds.    Pulmonary/Chest: Effort normal and breath sounds normal. He has no wheezes. He has no rhonchi. He has no rales.   Lymphadenopathy:     He has no cervical adenopathy.   Neurological: He is alert and oriented to person, place, and time. Coordination and gait normal.   Skin: Skin is warm, dry and intact.   Psychiatric: He has a normal mood and affect. His speech is normal and behavior is normal.   Vitals reviewed.      Assessment/Plan     Gerard was " "seen today for sinus problem.    Diagnoses and all orders for this visit:    Seasonal allergic rhinitis, unspecified chronicity, unspecified trigger  -     methylPREDNISolone acetate (DEPO-medrol) injection 80 mg; Inject 1 mL into the shoulder, thigh, or buttocks 1 (One) Time.    Acute non-recurrent pansinusitis    Other orders  -     amoxicillin-clavulanate (AUGMENTIN) 875-125 MG per tablet; Take 1 tablet by mouth 2 (Two) Times a Day for 7 days.    Risks and benefits of steroid injection discussed prior to administration.  Use saline nasal spray as needed to rinse nasal passages.  Avoid allergens when possible. We have discussed that a lot of times sinus problems are due to allergies or viruses and antibiotics do not help    \"Wait and see\" prescription for Augmentin given and he agreed to follow instructions.     Please monitor your blood pressure at home. Please notify Dr. Hill if you have high readings (consistently >160/90).      If symptoms persist or worsen please follow up with Dr. Hill or go to urgent care.     "

## 2018-06-04 ENCOUNTER — RESULTS ENCOUNTER (OUTPATIENT)
Dept: UROLOGY | Facility: CLINIC | Age: 73
End: 2018-06-04

## 2018-06-04 DIAGNOSIS — R97.20 ELEVATED PSA: ICD-10-CM

## 2018-06-04 LAB — PSA SERPL-MCNC: 1.73 NG/ML (ref 0–4)

## 2018-06-04 NOTE — PROGRESS NOTES
"  Mr. Ramirez is 72 y.o. male    CHIEF COMPLAINT: I am here for elevated PSA. My PSA is 1.730    HPI  He returns for elevated PSA. It has improved with watchful waiting. He has some urgency but not severe. Noted to have BPH by exam. Mild symptoms on watchful waiting.     Lab Results   Component Value Date    PSA 1.730 06/04/2018    PSA 4.3 (H) 11/27/2017         The following portions of the patient's history were reviewed and updated as appropriate: allergies, current medications, past family history, past medical history, past social history, past surgical history and problem list.      Review of Systems   Constitutional: Negative for chills and fever.   Gastrointestinal: Negative for abdominal pain, anal bleeding and blood in stool.   Genitourinary: Negative for flank pain, frequency, hematuria and urgency.         No current outpatient prescriptions on file.    Past Medical History:   Diagnosis Date   • Elevated PSA        Past Surgical History:   Procedure Laterality Date   • FOOT SURGERY     • TONSILLECTOMY         Social History     Social History   • Marital status: Unknown     Social History Main Topics   • Smoking status: Never Smoker   • Smokeless tobacco: Current User     Types: Chew   • Alcohol use No   • Drug use: Unknown     Other Topics Concern   • Not on file       Family History   Problem Relation Age of Onset   • No Known Problems Father    • No Known Problems Mother          /78   Temp 98.4 °F (36.9 °C)   Ht 172.7 cm (68\")   Wt 68.7 kg (151 lb 6.4 oz)   BMI 23.02 kg/m²       Physical Exam  Alert and oriented ×3  Not agitated or distressed  No obvious deformities  No respiratory distress  Skin without pallor or diaphoresis  BLAINE: Benign feeling prostate without nodule approximately 25-30 mL in size.   Penis and testicles are normal       Data  Results for orders placed or performed in visit on 06/11/18   POC Urinalysis Dipstick, Multipro   Result Value Ref Range    Color Yellow Yellow, " Straw, Dark Yellow, Ava    Clarity, UA Clear Clear    Glucose, UA Negative Negative, 1000 mg/dL (3+) mg/dL    Bilirubin Negative Negative    Ketones, UA Negative Negative    Specific Gravity  1.010 1.005 - 1.030    Blood, UA Negative Negative    pH, Urine 7.0 5.0 - 8.0    Protein, POC Negative Negative mg/dL    Urobilinogen, UA Normal Normal    Leukocytes Negative Negative    Nitrite, UA Negative Negative         Imaging Results (last 7 days)     ** No results found for the last 168 hours. **            Assessment and Plan  Diagnoses and all orders for this visit:    Elevated PSA  -     POC Urinalysis Dipstick, Multipro    Benign non-nodular prostatic hyperplasia with lower urinary tract symptoms    - PSA improved to normal  - It is explained the benign prostatic hyperplasia is a chronic urologic condition.  His voiding symptoms are stable on his current regimen. He has remained infection free since his last visit. He has no evidence of progression. We discussed symptoms that would be worrisome of either of these. We talked about the importance of being seen if he develops gross hematuria, burning with urination, or episodes that may be worrisome for inability to empty the bladder. We talked about medications that may increase the risk of urinary retention especially over the counter decongestants. This chronic issue appears stable overall. We will continue to monitor this with history, exam, urinalysis. Any suggestion of progression will require further work-up.     Both of these chronic conditions are stable.      Valdo Mendez MD  06/11/18  1:41 PM      Cc: Soto Hill MD

## 2018-06-11 ENCOUNTER — OFFICE VISIT (OUTPATIENT)
Dept: UROLOGY | Facility: CLINIC | Age: 73
End: 2018-06-11

## 2018-06-11 VITALS
HEIGHT: 68 IN | WEIGHT: 151.4 LBS | SYSTOLIC BLOOD PRESSURE: 130 MMHG | TEMPERATURE: 98.4 F | BODY MASS INDEX: 22.94 KG/M2 | DIASTOLIC BLOOD PRESSURE: 78 MMHG

## 2018-06-11 DIAGNOSIS — N40.1 BENIGN NON-NODULAR PROSTATIC HYPERPLASIA WITH LOWER URINARY TRACT SYMPTOMS: ICD-10-CM

## 2018-06-11 DIAGNOSIS — R97.20 ELEVATED PSA: Primary | ICD-10-CM

## 2018-06-11 PROCEDURE — 99213 OFFICE O/P EST LOW 20 MIN: CPT | Performed by: UROLOGY

## 2018-06-11 PROCEDURE — 81002 URINALYSIS NONAUTO W/O SCOPE: CPT | Performed by: UROLOGY

## 2018-10-22 ENCOUNTER — OFFICE VISIT (OUTPATIENT)
Dept: RETAIL CLINIC | Facility: CLINIC | Age: 73
End: 2018-10-22

## 2018-10-22 DIAGNOSIS — Z23 NEED FOR IMMUNIZATION AGAINST INFLUENZA: Primary | ICD-10-CM

## 2018-10-22 NOTE — PROGRESS NOTES
Patient presented requesting flu shot. Denies having problems with flu shots in the past. See administration note and scanned [employer] forms.

## 2018-12-30 ENCOUNTER — OFFICE VISIT (OUTPATIENT)
Dept: RETAIL CLINIC | Facility: CLINIC | Age: 73
End: 2018-12-30

## 2018-12-30 VITALS
RESPIRATION RATE: 16 BRPM | OXYGEN SATURATION: 98 % | HEART RATE: 98 BPM | BODY MASS INDEX: 22.2 KG/M2 | TEMPERATURE: 99.4 F | DIASTOLIC BLOOD PRESSURE: 96 MMHG | WEIGHT: 146 LBS | SYSTOLIC BLOOD PRESSURE: 141 MMHG

## 2018-12-30 DIAGNOSIS — J06.9 ACUTE UPPER RESPIRATORY INFECTION: Primary | ICD-10-CM

## 2018-12-30 PROCEDURE — 96372 THER/PROPH/DIAG INJ SC/IM: CPT | Performed by: NURSE PRACTITIONER

## 2018-12-30 PROCEDURE — 99213 OFFICE O/P EST LOW 20 MIN: CPT | Performed by: NURSE PRACTITIONER

## 2018-12-30 RX ORDER — AMOXICILLIN 875 MG/1
875 TABLET, COATED ORAL 2 TIMES DAILY
Qty: 20 TABLET | Refills: 0 | Status: SHIPPED | OUTPATIENT
Start: 2018-12-30 | End: 2019-01-09

## 2018-12-30 RX ORDER — METHYLPREDNISOLONE ACETATE 80 MG/ML
80 INJECTION, SUSPENSION INTRA-ARTICULAR; INTRALESIONAL; INTRAMUSCULAR; SOFT TISSUE ONCE
Status: COMPLETED | OUTPATIENT
Start: 2018-12-30 | End: 2018-12-30

## 2018-12-30 RX ADMIN — METHYLPREDNISOLONE ACETATE 80 MG: 80 INJECTION, SUSPENSION INTRA-ARTICULAR; INTRALESIONAL; INTRAMUSCULAR; SOFT TISSUE at 15:18

## 2018-12-30 NOTE — PROGRESS NOTES
"Subjective   Gerard Ramirez is a 73 y.o. male.     \"All this drainage in the back of my throat; I haven't had any problems since 2017.  Ricola cough drops usually help. But when it gets bad, they usually geve me an antibiotic and steroid shot.\"      URI    This is a new problem. The current episode started in the past 7 days. The problem has been gradually worsening. There has been no fever. Associated symptoms include congestion, coughing, a plugged ear sensation, a sore throat and wheezing. Pertinent negatives include no chest pain, diarrhea, ear pain, nausea, neck pain, sinus pain or vomiting. Treatments tried: Ricola. The treatment provided no relief.        The following portions of the patient's history were reviewed and updated as appropriate: allergies, current medications, past family history, past medical history, past social history, past surgical history and problem list.    Review of Systems   Constitutional: Negative for fever.   HENT: Positive for congestion, postnasal drip and sore throat. Negative for ear pain.    Respiratory: Positive for cough and wheezing. Negative for shortness of breath.    Cardiovascular: Negative for chest pain.   Gastrointestinal: Negative for diarrhea, nausea and vomiting.   Musculoskeletal: Negative for neck pain and neck stiffness.   Neurological: Negative for dizziness.       Objective      /96   Pulse 98   Temp 99.4 °F (37.4 °C)   Resp 16   Wt 66.2 kg (146 lb)   SpO2 98%   BMI 22.20 kg/m²     Physical Exam   Constitutional: He is oriented to person, place, and time. He appears well-developed and well-nourished. No distress.   HENT:   Head: Normocephalic and atraumatic.   Right Ear: Hearing, tympanic membrane, external ear and ear canal normal.   Left Ear: Hearing, tympanic membrane, external ear and ear canal normal.   Nose: Mucosal edema present. Right sinus exhibits no maxillary sinus tenderness and no frontal sinus tenderness. Left sinus exhibits no " maxillary sinus tenderness and no frontal sinus tenderness.   Mouth/Throat: Posterior oropharyngeal erythema present.       Eyes: Conjunctivae and EOM are normal. Pupils are equal, round, and reactive to light.   Neck: Normal range of motion. Neck supple.   Cardiovascular: Normal rate and regular rhythm.   Pulmonary/Chest: Effort normal and breath sounds normal.   Musculoskeletal: Normal range of motion.   Neurological: He is alert and oriented to person, place, and time. No cranial nerve deficit.   Skin: Skin is warm and dry. Capillary refill takes less than 2 seconds.   Psychiatric: He has a normal mood and affect. His behavior is normal. Judgment and thought content normal.   Nursing note and vitals reviewed.        Assessment/Plan   Gerard was seen today for uri.    Diagnoses and all orders for this visit:    Acute upper respiratory infection  -     methylPREDNISolone acetate (DEPO-medrol) injection 80 mg; Inject 1 mL into the appropriate muscle as directed by prescriber 1 (One) Time.    Other orders  -     amoxicillin (AMOXIL) 875 MG tablet; Take 1 tablet by mouth 2 (Two) Times a Day for 10 days.      Discussed that above symptoms appear viral at this time, and antibiotics will not treat a viral illness.  HOWEVER, a just in case Rx for amoxicillin was provided today. Favor conservative watchful waiting.  Patient may begin antibiotcs ONLY if symptoms worsen, as discussed.    Adequate rest and hydration, and steam inhalation may be useful.  Saline irrigation (Neti pot) or nasal saline spray may help with clearing congestion within the sinuses.     For worsening or persistent problems, follow up with your primary care provider. You have voiced understanding of treatment plan, visit summary provided.     NINA Blair

## 2018-12-30 NOTE — PATIENT INSTRUCTIONS
"Upper Respiratory Infection, Adult  Most upper respiratory infections (URIs) are a viral infection of the air passages leading to the lungs. A URI affects the nose, throat, and upper air passages. The most common type of URI is nasopharyngitis and is typically referred to as \"the common cold.\"  URIs run their course and usually go away on their own. Most of the time, a URI does not require medical attention, but sometimes a bacterial infection in the upper airways can follow a viral infection. This is called a secondary infection. Sinus and middle ear infections are common types of secondary upper respiratory infections.  Bacterial pneumonia can also complicate a URI. A URI can worsen asthma and chronic obstructive pulmonary disease (COPD). Sometimes, these complications can require emergency medical care and may be life threatening.  What are the causes?  Almost all URIs are caused by viruses. A virus is a type of germ and can spread from one person to another.  What increases the risk?  You may be at risk for a URI if:  · You smoke.  · You have chronic heart or lung disease.  · You have a weakened defense (immune) system.  · You are very young or very old.  · You have nasal allergies or asthma.  · You work in crowded or poorly ventilated areas.  · You work in health care facilities or schools.    What are the signs or symptoms?  Symptoms typically develop 2-3 days after you come in contact with a cold virus. Most viral URIs last 7-10 days. However, viral URIs from the influenza virus (flu virus) can last 14-18 days and are typically more severe. Symptoms may include:  · Runny or stuffy (congested) nose.  · Sneezing.  · Cough.  · Sore throat.  · Headache.  · Fatigue.  · Fever.  · Loss of appetite.  · Pain in your forehead, behind your eyes, and over your cheekbones (sinus pain).  · Muscle aches.    How is this diagnosed?  Your health care provider may diagnose a URI by:  · Physical exam.  · Tests to check that your " symptoms are not due to another condition such as:  ? Strep throat.  ? Sinusitis.  ? Pneumonia.  ? Asthma.    How is this treated?  A URI goes away on its own with time. It cannot be cured with medicines, but medicines may be prescribed or recommended to relieve symptoms. Medicines may help:  · Reduce your fever.  · Reduce your cough.  · Relieve nasal congestion.    Follow these instructions at home:  · Take medicines only as directed by your health care provider.  · Gargle warm saltwater or take cough drops to comfort your throat as directed by your health care provider.  · Use a warm mist humidifier or inhale steam from a shower to increase air moisture. This may make it easier to breathe.  · Drink enough fluid to keep your urine clear or pale yellow.  · Eat soups and other clear broths and maintain good nutrition.  · Rest as needed.  · Return to work when your temperature has returned to normal or as your health care provider advises. You may need to stay home longer to avoid infecting others. You can also use a face mask and careful hand washing to prevent spread of the virus.  · Increase the usage of your inhaler if you have asthma.  · Do not use any tobacco products, including cigarettes, chewing tobacco, or electronic cigarettes. If you need help quitting, ask your health care provider.  How is this prevented?  The best way to protect yourself from getting a cold is to practice good hygiene.  · Avoid oral or hand contact with people with cold symptoms.  · Wash your hands often if contact occurs.    There is no clear evidence that vitamin C, vitamin E, echinacea, or exercise reduces the chance of developing a cold. However, it is always recommended to get plenty of rest, exercise, and practice good nutrition.  Contact a health care provider if:  · You are getting worse rather than better.  · Your symptoms are not controlled by medicine.  · You have chills.  · You have worsening shortness of breath.  · You have  brown or red mucus.  · You have yellow or brown nasal discharge.  · You have pain in your face, especially when you bend forward.  · You have a fever.  · You have swollen neck glands.  · You have pain while swallowing.  · You have white areas in the back of your throat.  Get help right away if:  · You have severe or persistent:  ? Headache.  ? Ear pain.  ? Sinus pain.  ? Chest pain.  · You have chronic lung disease and any of the following:  ? Wheezing.  ? Prolonged cough.  ? Coughing up blood.  ? A change in your usual mucus.  · You have a stiff neck.  · You have changes in your:  ? Vision.  ? Hearing.  ? Thinking.  ? Mood.  This information is not intended to replace advice given to you by your health care provider. Make sure you discuss any questions you have with your health care provider.  Document Released: 06/13/2002 Document Revised: 08/20/2017 Document Reviewed: 03/25/2015  [employer] Interactive Patient Education © 2018 [employer]

## 2019-06-03 ENCOUNTER — LAB (OUTPATIENT)
Dept: LAB | Facility: HOSPITAL | Age: 74
End: 2019-06-03

## 2019-06-03 ENCOUNTER — OFFICE VISIT (OUTPATIENT)
Dept: INTERNAL MEDICINE | Facility: CLINIC | Age: 74
End: 2019-06-03

## 2019-06-03 VITALS
WEIGHT: 146.56 LBS | TEMPERATURE: 98.7 F | BODY MASS INDEX: 22.21 KG/M2 | HEART RATE: 76 BPM | SYSTOLIC BLOOD PRESSURE: 142 MMHG | OXYGEN SATURATION: 99 % | DIASTOLIC BLOOD PRESSURE: 88 MMHG | HEIGHT: 68 IN | RESPIRATION RATE: 12 BRPM

## 2019-06-03 DIAGNOSIS — I10 ESSENTIAL HYPERTENSION: ICD-10-CM

## 2019-06-03 DIAGNOSIS — Z12.5 SCREENING PSA (PROSTATE SPECIFIC ANTIGEN): ICD-10-CM

## 2019-06-03 DIAGNOSIS — K21.9 GASTROESOPHAGEAL REFLUX DISEASE, ESOPHAGITIS PRESENCE NOT SPECIFIED: Primary | ICD-10-CM

## 2019-06-03 DIAGNOSIS — J30.2 SEASONAL ALLERGIC RHINITIS, UNSPECIFIED TRIGGER: ICD-10-CM

## 2019-06-03 DIAGNOSIS — Z13.220 SCREENING, LIPID: ICD-10-CM

## 2019-06-03 DIAGNOSIS — Z13.1 SCREENING FOR DIABETES MELLITUS: ICD-10-CM

## 2019-06-03 PROBLEM — J30.9 ALLERGIC RHINITIS: Status: ACTIVE | Noted: 2019-06-03

## 2019-06-03 LAB
ALBUMIN SERPL-MCNC: 4.3 G/DL (ref 3.5–5)
ALBUMIN/GLOB SERPL: 1.7 G/DL (ref 1.1–2.5)
ALP SERPL-CCNC: 63 U/L (ref 24–120)
ALT SERPL W P-5'-P-CCNC: 17 U/L (ref 0–54)
ANION GAP SERPL CALCULATED.3IONS-SCNC: 5 MMOL/L (ref 4–13)
ARTICHOKE IGE QN: 135 MG/DL (ref 0–99)
AST SERPL-CCNC: 24 U/L (ref 7–45)
BILIRUB SERPL-MCNC: 1 MG/DL (ref 0.1–1)
BUN BLD-MCNC: 13 MG/DL (ref 5–21)
BUN/CREAT SERPL: 14.6 (ref 7–25)
CALCIUM SPEC-SCNC: 10 MG/DL (ref 8.4–10.4)
CHLORIDE SERPL-SCNC: 104 MMOL/L (ref 98–110)
CHOLEST SERPL-MCNC: 215 MG/DL (ref 130–200)
CO2 SERPL-SCNC: 33 MMOL/L (ref 24–31)
CREAT BLD-MCNC: 0.89 MG/DL (ref 0.5–1.4)
GFR SERPL CREATININE-BSD FRML MDRD: 84 ML/MIN/1.73
GLOBULIN UR ELPH-MCNC: 2.5 GM/DL
GLUCOSE BLD-MCNC: 124 MG/DL (ref 70–100)
HDLC SERPL-MCNC: 51 MG/DL
LDLC/HDLC SERPL: 2.4 {RATIO}
POTASSIUM BLD-SCNC: 4.1 MMOL/L (ref 3.5–5.3)
PROT SERPL-MCNC: 6.8 G/DL (ref 6.3–8.7)
PSA SERPL-MCNC: 1.81 NG/ML (ref 0–4)
SODIUM BLD-SCNC: 142 MMOL/L (ref 135–145)
TRIGL SERPL-MCNC: 208 MG/DL (ref 0–149)

## 2019-06-03 PROCEDURE — 99204 OFFICE O/P NEW MOD 45 MIN: CPT | Performed by: FAMILY MEDICINE

## 2019-06-03 PROCEDURE — G0103 PSA SCREENING: HCPCS | Performed by: FAMILY MEDICINE

## 2019-06-03 PROCEDURE — 80061 LIPID PANEL: CPT | Performed by: FAMILY MEDICINE

## 2019-06-03 PROCEDURE — 80053 COMPREHEN METABOLIC PANEL: CPT | Performed by: FAMILY MEDICINE

## 2019-06-03 PROCEDURE — 36415 COLL VENOUS BLD VENIPUNCTURE: CPT

## 2019-06-03 NOTE — PATIENT INSTRUCTIONS
Try the pepsid at home to see if that helps your throat and acid reflux and heartburn.     See us back in 4 weeks to discuss labs and Medicare wellness physical

## 2019-06-03 NOTE — PROGRESS NOTES
CC:   Chief Complaint   Patient presents with   • Establish Care     Patient thinks he may have scratched his Uvula when taking amoxicillin. Makes him want to cough, just feels irritated   • Heartburn       History:  Gerard Ramirez is a 73 y.o. male who presents today for evaluation of the above problems.      Here to establish care reporting scratching and throat and mild to moderate throat pain that has worsened since he has had worsening of chronic heartburn.  Usually only gets it intermittently, but with the increase in spicy foods he is getting it on a more regular basis.  His current treatment is with dairy products including cottage cheese and heavy whipping cream.  He has never tried Tums, wants to avoid all medications if he can, but he has had Pepcid in the past which did improve his symptoms.    He also has chronic sinus problems, reports current chronic rhinorrhea but is hesitant to try antihistamine therapy and previously says that he may be allergic to Flonase so he stopped taking it.  Last treatment for sinusitis was in December when he had an antibiotic course.    Interested in screening labs, wants repeat PSA as it has been high previously.     ROS:  Review of Systems   HENT: Positive for sore throat. Rhinorrhea: chronic.    Gastrointestinal:        Heartburn       Allergies   Allergen Reactions   • Avelox Abc [Moxifloxacin]    • Azithromycin    • Latex      States he is not allergic to latex and has taken flu shot every year   • Lipitor [Atorvastatin]      Past Medical History:   Diagnosis Date   • Anxiety    • Decreased libido    • Elevated PSA    • Erectile dysfunction    • GERD (gastroesophageal reflux disease)    • Sinus problem      Past Surgical History:   Procedure Laterality Date   • CIRCUMCISION     • FOOT SURGERY Left    • TONSILLECTOMY       Family History   Problem Relation Age of Onset   • No Known Problems Father    • Diabetes Mother    • Heart disease Mother    • Heart disease Sister  "   • Heart disease Brother       reports that he has been smoking cigarettes.  He quit smokeless tobacco use about 34 years ago. His smokeless tobacco use included chew. He reports that he does not drink alcohol or use drugs.    No current outpatient medications on file.    OBJECTIVE:  /88 (BP Location: Left arm, Patient Position: Sitting, Cuff Size: Adult)   Pulse 76   Temp 98.7 °F (37.1 °C) (Temporal)   Resp 12   Ht 172.7 cm (68\")   Wt 66.5 kg (146 lb 9 oz)   SpO2 99%   BMI 22.28 kg/m²    Physical Exam   Constitutional: He is oriented to person, place, and time. No distress.   HENT:   Head: Normocephalic and atraumatic.   Nose: Nose normal.   Mouth/Throat: No oropharyngeal exudate.   Normal TMs and turbinates   Eyes: Conjunctivae are normal. Right eye exhibits no discharge. Left eye exhibits no discharge. No scleral icterus.   Neck: No tracheal deviation present.   Cardiovascular: Normal rate, regular rhythm and normal heart sounds. Exam reveals no gallop and no friction rub.   No murmur heard.  Pulmonary/Chest: Effort normal and breath sounds normal. No respiratory distress. He has no wheezes. He has no rales.   Abdominal: Soft. Bowel sounds are normal. He exhibits no distension. There is no tenderness.   Neurological: He is alert and oriented to person, place, and time.   Skin: Skin is warm and dry. He is not diaphoretic. No pallor.   Psychiatric: He has a normal mood and affect. His behavior is normal. Judgment and thought content normal.   Nursing note and vitals reviewed.      Assessment/Plan    Problem List Items Addressed This Visit        Respiratory    Allergic rhinitis       Digestive    GERD (gastroesophageal reflux disease) - Primary      Other Visit Diagnoses     Screening PSA (prostate specific antigen)        Relevant Orders    PSA SCREENING (Completed)    Screening, lipid        Relevant Orders    Lipid panel (Completed)    Screening for diabetes mellitus        Essential hypertension  "       Relevant Orders    Comprehensive Metabolic Panel (Completed)      -declines antihistamine for nasal symptoms  -recommend pepsid for at least 2 wks  -f/u in 4 weeks for medicare wellness physical    Patient's Body mass index is 22.28 kg/m². BMI is within normal parameters. No follow-up required..      An After Visit Summary was printed and given to the patient at discharge.  Return in about 4 weeks (around 7/1/2019) for Medicare Wellness.         Mike Villa D.O.  [facility]  Oste[facility] Neuromusculoskeletal [facility]  6/3/2019

## 2019-07-01 ENCOUNTER — OFFICE VISIT (OUTPATIENT)
Dept: INTERNAL MEDICINE | Facility: CLINIC | Age: 74
End: 2019-07-01

## 2019-07-01 VITALS
OXYGEN SATURATION: 98 % | HEIGHT: 68 IN | BODY MASS INDEX: 22 KG/M2 | RESPIRATION RATE: 16 BRPM | TEMPERATURE: 98.2 F | HEART RATE: 64 BPM | SYSTOLIC BLOOD PRESSURE: 160 MMHG | WEIGHT: 145.13 LBS | DIASTOLIC BLOOD PRESSURE: 92 MMHG

## 2019-07-01 DIAGNOSIS — Z00.00 MEDICARE ANNUAL WELLNESS VISIT, SUBSEQUENT: Primary | ICD-10-CM

## 2019-07-01 PROCEDURE — G0439 PPPS, SUBSEQ VISIT: HCPCS | Performed by: FAMILY MEDICINE

## 2019-07-01 NOTE — PROGRESS NOTES
QUICK REFERENCE INFORMATION:  The ABCs of the Annual Wellness Visit    Subsequent Medicare Wellness Visit     HEALTH RISK ASSESSMENT    : 1945    Recent Hospitalizations:  No hospitalization(s) within the last year..  ccc    Current Medical Providers:  Patient Care Team:  Mike Villa DO as PCP - General (Family Medicine)    Smoking Status:  Social History     Tobacco Use   Smoking Status Current Some Day Smoker   • Types: Cigarettes   Smokeless Tobacco Former User   • Types: Chew   • Quit date:    Tobacco Comment    Patient only smokes occassionally     Alcohol Consumption:  Social History     Substance and Sexual Activity   Alcohol Use No     Depression Screen:   No flowsheet data found.    Health Habits and Functional and Cognitive Screening:  No flowsheet data found.    Does the patient have evidence of cognitive impairment? No    Asiprin use counseling: Contraindicated from taking ASA due to age, pt not interested in starting      Recent Lab Results:          Lab Results   Component Value Date    CHOL 215 (H) 2019    TRIG 208 (H) 2019    HDL 51 2019    LDLHDL 2.40 2019       Age-appropriate Screening Schedule:  Refer to the list below for future screening recommendations based on patient's age, sex and/or medical conditions. Orders for these recommended tests are listed in the plan section. The patient has been provided with a written plan.    Health Maintenance   Topic Date Due   • TDAP/TD VACCINES (1 - Tdap) 10/05/1964   • ZOSTER VACCINE (1 of 2) 10/05/1995   • PNEUMOCOCCAL VACCINES (65+ LOW/MEDIUM RISK) (1 of 2 - PCV13) 10/05/2010   • INFLUENZA VACCINE  2019   • COLONOSCOPY  2027        Subjective   History of Present Illness    Gerard Ramirez is a 73 y.o. male who presents for an Annual Wellness Visit.    The following portions of the patient's history were reviewed and updated as appropriate: allergies, current medications, past family history, past  medical history, past social history, past surgical history and problem list.    No outpatient medications prior to visit.     No facility-administered medications prior to visit.        Patient Active Problem List   Diagnosis   • Encounter for colonoscopy due to history of adenomatous colonic polyps   • Family history of colonic polyps   • GERD (gastroesophageal reflux disease)   • Allergic rhinitis       Advance Care Planning:  Patient does not have an advance directive - information provided to the patient today    Identification of Risk Factors:  Risk factors include: tobacco use.    Review of Systems   Constitutional: Negative for chills, fatigue and fever.   HENT: Negative for congestion.    Eyes: Negative for visual disturbance.   Respiratory: Negative for shortness of breath.    Cardiovascular: Negative for chest pain.   Gastrointestinal: Negative for abdominal pain.   Endocrine: Negative for polydipsia and polyuria.   Genitourinary: Negative for difficulty urinating.   Musculoskeletal: Negative for arthralgias, gait problem and joint swelling.   Skin: Negative for rash.   Allergic/Immunologic: Negative for immunocompromised state.   Neurological: Negative for dizziness and light-headedness.   Hematological: Negative for adenopathy.   Psychiatric/Behavioral: Negative for behavioral problems and sleep disturbance.       Compared to one year ago, the patient feels his physical health is the same.  Compared to one year ago, the patient feels his mental health is the same.    Objective     Physical Exam   Constitutional: He is oriented to person, place, and time. No distress.   HENT:   Head: Normocephalic and atraumatic.   Right Ear: External ear normal.   Left Ear: External ear normal.   Nose: Nose normal.   TMs normal   Eyes: Conjunctivae and EOM are normal. Pupils are equal, round, and reactive to light. Right eye exhibits no discharge. Left eye exhibits no discharge. No scleral icterus.   Neck: Neck supple.  "No JVD present. No tracheal deviation present. No thyromegaly present.   Cardiovascular: Normal rate, regular rhythm and normal heart sounds. Exam reveals no gallop and no friction rub.   No murmur heard.  Pulmonary/Chest: Effort normal and breath sounds normal. No respiratory distress. He has no wheezes. He has no rales.   Abdominal: Soft. There is no tenderness.   Musculoskeletal: He exhibits no edema.   Lymphadenopathy:     He has no cervical adenopathy.   Neurological: He is alert and oriented to person, place, and time. He displays normal reflexes. No cranial nerve deficit or sensory deficit. He exhibits normal muscle tone. Coordination normal.   Skin: Skin is warm and dry. No rash noted. He is not diaphoretic. No pallor.   Psychiatric: He has a normal mood and affect. His behavior is normal. Judgment and thought content normal.   Nursing note and vitals reviewed.      Vitals:    07/01/19 1102   BP: 160/92   BP Location: Left arm   Patient Position: Sitting   Cuff Size: Adult   Pulse: 64   Resp: 16   Temp: 98.2 °F (36.8 °C)   TempSrc: Temporal   SpO2: 98%   Weight: 65.8 kg (145 lb 2 oz)   Height: 172.7 cm (68\")       Patient's Body mass index is 22.07 kg/m². BMI is within normal parameters. No follow-up required..      Assessment/Plan   Patient Self-Management and Personalized Health Advice  The patient has been provided with information about: designing advance directives and preventive services including:   · Advance directive, declines vaccines.    Visit Diagnoses:    ICD-10-CM ICD-9-CM   1. Medicare annual wellness visit, subsequent Z00.00 V70.0       No orders of the defined types were placed in this encounter.      No outpatient encounter medications on file as of 7/1/2019.     No facility-administered encounter medications on file as of 7/1/2019.    declines vaccinations, does not want further colonoscopy testing    Reviewed use of high risk medication in the elderly: no  Reviewed for potential of " harmful drug interactions in the elderly: no    Follow Up:  Return in about 1 year (around 7/1/2020) for Annual physical, Medicare Wellness.     An After Visit Summary and PPPS with all of these plans were given to the patient.         Mike Villa DO

## 2019-07-15 ENCOUNTER — HOSPITAL ENCOUNTER (OUTPATIENT)
Dept: GENERAL RADIOLOGY | Facility: HOSPITAL | Age: 74
Discharge: HOME | End: 2019-07-15
Admitting: NURSE PRACTITIONER

## 2019-07-15 ENCOUNTER — HOSPITAL ENCOUNTER (OUTPATIENT)
Dept: GENERAL RADIOLOGY | Facility: HOSPITAL | Age: 74
Discharge: HOME | End: 2019-07-15

## 2019-07-15 ENCOUNTER — TELEPHONE (OUTPATIENT)
Dept: URGENT CARE | Facility: CLINIC | Age: 74
End: 2019-07-15

## 2019-07-15 PROCEDURE — 73590 X-RAY EXAM OF LOWER LEG: CPT

## 2019-07-15 PROCEDURE — 73562 X-RAY EXAM OF KNEE 3: CPT

## 2019-07-15 PROCEDURE — 73502 X-RAY EXAM HIP UNI 2-3 VIEWS: CPT

## 2019-08-14 ENCOUNTER — OFFICE VISIT (OUTPATIENT)
Dept: RETAIL CLINIC | Facility: CLINIC | Age: 74
End: 2019-08-14

## 2019-08-14 VITALS
TEMPERATURE: 98.1 F | WEIGHT: 145.6 LBS | BODY MASS INDEX: 22.07 KG/M2 | SYSTOLIC BLOOD PRESSURE: 160 MMHG | OXYGEN SATURATION: 98 % | DIASTOLIC BLOOD PRESSURE: 82 MMHG | HEIGHT: 68 IN | HEART RATE: 78 BPM | RESPIRATION RATE: 16 BRPM

## 2019-08-14 DIAGNOSIS — R21 RASH AND NONSPECIFIC SKIN ERUPTION: Primary | ICD-10-CM

## 2019-08-14 PROCEDURE — 99213 OFFICE O/P EST LOW 20 MIN: CPT | Performed by: NURSE PRACTITIONER

## 2019-08-14 RX ORDER — TRIAMCINOLONE ACETONIDE 1 MG/G
CREAM TOPICAL 2 TIMES DAILY
Qty: 45 G | Refills: 0 | Status: SHIPPED | OUTPATIENT
Start: 2019-08-14 | End: 2019-08-21

## 2019-08-14 NOTE — PROGRESS NOTES
Chief Complaint   Patient presents with   • Rash     Subjective   Gerard Ramirez is a 73 y.o. male who presents to the clinic today.  He states he has had a rash on his right arm that has gradually worsened.  It started 2 days ago on right shoulder and bend of right arm.  He now also has an area on right forearm.  He states he felt a stinging sensation a few days ago while sitting in his recliner at home and thinks something may have bit/stung him.  He denies any itching or pain in the area.  He does mention that about 4-5 days ago he pulled his right neck/shoulder; he describes that pain as a tightness/straining pain that has since resolved.  He has not noticed any insects in his home and denies any animals in the home.  He does not live with anyone else.  He has tried both hydrocortisone cream (for one day) and antibiotic ointment.      Rash   This is a new problem. Episode onset: 2 days ago  The problem has been gradually worsening since onset. The affected locations include the right arm. The rash is characterized by redness. It is unknown if there was an exposure to a precipitant. Pertinent negatives include no anorexia, congestion, cough, diarrhea, eye pain, facial edema, fatigue, fever, joint pain, nail changes, rhinorrhea, shortness of breath, sore throat or vomiting. Treatments tried: hydrocortisone and antibiotic ointment  The treatment provided no relief. There is no history of allergies, asthma, eczema or varicella.         Current Outpatient Medications:   None reported.     Allergies:  Avelox abc [moxifloxacin]; Azithromycin; Latex; and Lipitor [atorvastatin]    Past Medical History:   Diagnosis Date   • Anxiety    • Decreased libido    • Elevated PSA    • Erectile dysfunction    • GERD (gastroesophageal reflux disease)    • Sinus problem      Past Surgical History:   Procedure Laterality Date   • CIRCUMCISION     • FOOT SURGERY Left    • TONSILLECTOMY       Family History   Problem Relation Age of  "Onset   • No Known Problems Father    • Diabetes Mother    • Heart disease Mother    • Heart disease Sister    • Heart disease Brother      Social History     Tobacco Use   • Smoking status: Former Smoker     Types: Cigarettes     Last attempt to quit: 1985     Years since quittin.6   • Smokeless tobacco: Current User     Types: Chew, Snuff   • Tobacco comment: Patient only smokes occassionally   Substance Use Topics   • Alcohol use: No   • Drug use: No       Review of Systems  Review of Systems   Constitutional: Negative for appetite change, chills, diaphoresis, fatigue and fever.   HENT: Negative for congestion, ear pain, hearing loss, rhinorrhea and sore throat.    Eyes: Negative for pain, redness and itching.   Respiratory: Negative for cough and shortness of breath.    Cardiovascular: Negative for chest pain and palpitations.   Gastrointestinal: Negative for anorexia, diarrhea, nausea and vomiting.   Musculoskeletal: Negative for joint pain, myalgias, neck pain and neck stiffness.   Skin: Positive for rash. Negative for nail changes.       Objective   /82   Pulse 78   Temp 98.1 °F (36.7 °C) (Oral)   Resp 16   Ht 172.7 cm (68\")   Wt 66 kg (145 lb 9.6 oz)   SpO2 98%   BMI 22.14 kg/m²       Physical Exam   Constitutional: He is oriented to person, place, and time. He appears well-developed and well-nourished. He is active and cooperative.  Non-toxic appearance. He does not have a sickly appearance. He does not appear ill. No distress.   Cardiovascular: Normal rate, regular rhythm, S1 normal, S2 normal and normal heart sounds.   Pulmonary/Chest: Effort normal and breath sounds normal.   Neurological: He is alert and oriented to person, place, and time.   Skin: Skin is warm, dry and intact. Lesion and rash noted. No abrasion, no bruising, no burn, no ecchymosis, no laceration, no petechiae and no purpura noted. Rash is maculopapular and vesicular. Rash is not nodular, not pustular and not " urticarial. He is not diaphoretic. There is erythema. No pallor.   Rash scattered along right arm: lesions on right upper arm are reddened maculopapular/vesicular grouped clusters, lesions in bend of right arm are two quarter-sized reddened maculopapular lesions, and small area on right lower forearm is a reddened maculopapular/slightly vesicular cluster.  No tenderness upon palpation of any area, crusting, streaking, or warmth.  No necrotic areas or scabbing.         Psychiatric: He has a normal mood and affect. His speech is normal and behavior is normal. Thought content normal.   Vitals reviewed.      Assessment/Plan     Gerard was seen today for rash.    Diagnoses and all orders for this visit:    Rash and nonspecific skin eruption    Other orders  -     triamcinolone (KENALOG) 0.1 % cream; Apply  topically to the appropriate area as directed 2 (Two) Times a Day for 7 days.    Possible rash could be insect bites, but appearance also resembles shingles.  Pt reports never having chickenpox.  Discussed with pt due to possibility of shingles, recommend antiviral (e.g. Valtrex); pt declines antiviral at this time.  He states he is afraid he will be allergic since he has never tried it.  Explained to pt that this is not an antibiotic (not in the same family as his other allergies), but he states he lives alone and does not want to risk an allergic reaction.  Discussed with pt that we can try triamcinolone cream, but if no improvement in 2 days, or any worsening, to please follow up with his PCP for re-evaluation.  Recommend also to avoid contact with pregnant people, young children, immunosuppressed (as rash could be contagious) until better.  He voiced understanding and in agreement.

## 2019-08-20 ENCOUNTER — OFFICE VISIT (OUTPATIENT)
Dept: INTERNAL MEDICINE | Facility: CLINIC | Age: 74
End: 2019-08-20

## 2019-08-20 VITALS
DIASTOLIC BLOOD PRESSURE: 92 MMHG | BODY MASS INDEX: 21.56 KG/M2 | HEART RATE: 64 BPM | RESPIRATION RATE: 12 BRPM | OXYGEN SATURATION: 99 % | HEIGHT: 68 IN | SYSTOLIC BLOOD PRESSURE: 140 MMHG | WEIGHT: 142.25 LBS | TEMPERATURE: 97.3 F

## 2019-08-20 DIAGNOSIS — Z23 NEED FOR PNEUMOCOCCAL VACCINE: ICD-10-CM

## 2019-08-20 DIAGNOSIS — R21 RASH: Primary | ICD-10-CM

## 2019-08-20 DIAGNOSIS — I10 ESSENTIAL HYPERTENSION: ICD-10-CM

## 2019-08-20 DIAGNOSIS — Z23 NEED FOR TDAP VACCINATION: ICD-10-CM

## 2019-08-20 PROCEDURE — G0009 ADMIN PNEUMOCOCCAL VACCINE: HCPCS | Performed by: FAMILY MEDICINE

## 2019-08-20 PROCEDURE — 99214 OFFICE O/P EST MOD 30 MIN: CPT | Performed by: FAMILY MEDICINE

## 2019-08-20 PROCEDURE — 90732 PPSV23 VACC 2 YRS+ SUBQ/IM: CPT | Performed by: FAMILY MEDICINE

## 2019-08-20 RX ORDER — AMLODIPINE BESYLATE 5 MG/1
5 TABLET ORAL DAILY
Qty: 30 TABLET | Refills: 1 | Status: SHIPPED | OUTPATIENT
Start: 2019-08-20 | End: 2021-11-16

## 2019-08-20 NOTE — PATIENT INSTRUCTIONS
Amlodipine tablets  What is this medicine?  AMLODIPINE (am CARLA rod) is a calcium-channel blocker. It affects the amount of calcium found in your heart and muscle cells. This relaxes your blood vessels, which can reduce the amount of work the heart has to do. This medicine is used to lower high blood pressure. It is also used to prevent chest pain.  This medicine may be used for other purposes; ask your health care provider or pharmacist if you have questions.  COMMON BRAND NAME(S): Norvasc  What should I tell my health care provider before I take this medicine?  They need to know if you have any of these conditions:  -heart problems like heart failure or aortic stenosis  -liver disease  -an unusual or allergic reaction to amlodipine, other medicines, foods, dyes, or preservatives  -pregnant or trying to get pregnant  -breast-feeding  How should I use this medicine?  Take this medicine by mouth with a glass of water. Follow the directions on the prescription label. Take your medicine at regular intervals. Do not take more medicine than directed.  Talk to your pediatrician regarding the use of this medicine in children. Special care may be needed. This medicine has been used in children as young as 6.  Persons over 65 years old may have a stronger reaction to this medicine and need smaller doses.  Overdosage: If you think you have taken too much of this medicine contact a poison control center or emergency room at once.  NOTE: This medicine is only for you. Do not share this medicine with others.  What if I miss a dose?  If you miss a dose, take it as soon as you can. If it is almost time for your next dose, take only that dose. Do not take double or extra doses.  What may interact with this medicine?  -herbal or dietary supplements  -local or general anesthetics  -medicines for high blood pressure  -medicines for prostate problems  -rifampin  This list may not describe all possible interactions. Give your health  care provider a list of all the medicines, herbs, non-prescription drugs, or dietary supplements you use. Also tell them if you smoke, drink alcohol, or use illegal drugs. Some items may interact with your medicine.  What should I watch for while using this medicine?  Visit your doctor or health care professional for regular check ups. Check your blood pressure and pulse rate regularly. Ask your health care professional what your blood pressure and pulse rate should be, and when you should contact him or her.  This medicine may make you feel confused, dizzy or lightheaded. Do not drive, use machinery, or do anything that needs mental alertness until you know how this medicine affects you. To reduce the risk of dizzy or fainting spells, do not sit or stand up quickly, especially if you are an older patient. Avoid alcoholic drinks; they can make you more dizzy.  Do not suddenly stop taking amlodipine. Ask your doctor or health care professional how you can gradually reduce the dose.  What side effects may I notice from receiving this medicine?  Side effects that you should report to your doctor or health care professional as soon as possible:  -allergic reactions like skin rash, itching or hives, swelling of the face, lips, or tongue  -breathing problems  -changes in vision or hearing  -chest pain  -fast, irregular heartbeat  -swelling of legs or ankles  Side effects that usually do not require medical attention (report to your doctor or health care professional if they continue or are bothersome):  -dry mouth  -facial flushing  -nausea, vomiting  -stomach gas, pain  -tired, weak  -trouble sleeping  This list may not describe all possible side effects. Call your doctor for medical advice about side effects. You may report side effects to FDA at 5-446-FDA-4969.  Where should I keep my medicine?  Keep out of the reach of children.  Store at room temperature between 59 and 86 degrees F (15 and 30 degrees C). Protect from  light. Keep container tightly closed. Throw away any unused medicine after the expiration date.  NOTE: This sheet is a summary. It may not cover all possible information. If you have questions about this medicine, talk to your doctor, pharmacist, or health care provider.  © 2019 [employer]/Gold Standard (2013-11-15 11:40:58)

## 2019-08-20 NOTE — PROGRESS NOTES
"CC:   Chief Complaint   Patient presents with   • Rash     Patient reports a rash on right arm that started after multiple insect stings, temp was 104 for a couple of days   • Fever       History:  Gerard Ramirez is a 73 y.o. male who presents today for follow-up for evaluation of the above:    Seen in  on 8/14 for insect bites of unknown origin, thought it may have been a spider or shingles outbreak. No pain, itching well controlled on triamcinolone cream.     BP is elevated today as well as multiple visits. Apprehensive of taking medications.     ROS:  Review of Systems   Constitutional: Negative for fatigue.   Respiratory: Negative.    Cardiovascular: Negative.    Skin: Positive for rash.   Neurological: Negative.        Mr. Ramirez  reports that he quit smoking about 34 years ago. His smoking use included cigarettes. His smokeless tobacco use includes chew and snuff. He reports that he does not drink alcohol or use drugs.      Current Outpatient Medications:   •  triamcinolone (KENALOG) 0.1 % cream, Apply  topically to the appropriate area as directed 2 (Two) Times a Day for 7 days., Disp: 45 g, Rfl: 0  •  amLODIPine (NORVASC) 5 MG tablet, Take 1 tablet by mouth Daily., Disp: 30 tablet, Rfl: 1  •  naproxen (NAPROSYN) 500 MG tablet, Take 1 tablet by mouth 2 (Two) Times a Day As Needed for Mild Pain ., Disp: 30 tablet, Rfl: 0      OBJECTIVE:  /92 (BP Location: Left arm, Patient Position: Sitting, Cuff Size: Adult)   Pulse 64   Temp 97.3 °F (36.3 °C) (Temporal)   Resp 12   Ht 172.7 cm (68\")   Wt 64.5 kg (142 lb 4 oz)   SpO2 99%   BMI 21.63 kg/m²    Physical Exam   Constitutional: He is oriented to person, place, and time. No distress.   HENT:   Head: Normocephalic and atraumatic.   Nose: Nose normal.   Eyes: Conjunctivae are normal. Right eye exhibits no discharge. Left eye exhibits no discharge. No scleral icterus.   Neck: No tracheal deviation present.   Cardiovascular: Normal rate, regular rhythm " and normal heart sounds. Exam reveals no gallop and no friction rub.   No murmur heard.  Pulmonary/Chest: Effort normal and breath sounds normal. No respiratory distress. He has no wheezes. He has no rales.   Neurological: He is alert and oriented to person, place, and time.   Skin: Skin is warm and dry. He is not diaphoretic. No pallor.   Well healing well circumscribed spherical papules consistent with insect bite   Psychiatric: He has a normal mood and affect. His behavior is normal. Judgment and thought content normal.   Nursing note and vitals reviewed.      Assessment/Plan     Diagnosis Plan   1. Rash     2. Essential hypertension  amLODIPine (NORVASC) 5 MG tablet   3. Need for Tdap vaccination  Tdap Vaccine Greater Than or Equal To 8yo IM   4. Need for pneumococcal vaccine  Pneumococcal polysaccharide vaccine 23-valent >= 1yo subcutaneous/IM (PPSV23)   -continue triamcinolone cream  -Rx for amlodipine discussed and prescribed today, pt still apprehensive about taking it  -Tdap and pneumococcal vaccines today  -f/u 4 weeks to check BP    Patient's Body mass index is 21.63 kg/m². BMI is within normal parameters. No follow-up required..      An After Visit Summary was printed and given to the patient at discharge.  Return in about 4 weeks (around 9/17/2019), or if symptoms worsen or fail to improve. Sooner if problems arise.         Mike Villa D.O.  Family Medicine  Osteopathic Neuromusculoskeletal Medicine

## 2019-09-17 ENCOUNTER — OFFICE VISIT (OUTPATIENT)
Dept: INTERNAL MEDICINE | Facility: CLINIC | Age: 74
End: 2019-09-17

## 2019-09-17 VITALS
WEIGHT: 139 LBS | RESPIRATION RATE: 12 BRPM | BODY MASS INDEX: 21.07 KG/M2 | TEMPERATURE: 98.5 F | SYSTOLIC BLOOD PRESSURE: 164 MMHG | HEIGHT: 68 IN | HEART RATE: 68 BPM | OXYGEN SATURATION: 98 % | DIASTOLIC BLOOD PRESSURE: 98 MMHG

## 2019-09-17 DIAGNOSIS — E78.5 HYPERLIPIDEMIA, UNSPECIFIED HYPERLIPIDEMIA TYPE: ICD-10-CM

## 2019-09-17 DIAGNOSIS — F41.9 ANXIETY: ICD-10-CM

## 2019-09-17 DIAGNOSIS — I10 ESSENTIAL HYPERTENSION: Primary | ICD-10-CM

## 2019-09-17 PROCEDURE — 99214 OFFICE O/P EST MOD 30 MIN: CPT | Performed by: FAMILY MEDICINE

## 2019-09-17 NOTE — PROGRESS NOTES
"CC:   Chief Complaint   Patient presents with   • Follow-up     Patient has not started Amlodipine yet. would also like to discuss labs from 6-3-19       History:  Gerard Ramirez is a 73 y.o. male who presents today for follow-up for evaluation of the above:    BP still elevated today, has not started amlodipine, very anxious about medical side effects.  He plans on starting it later this month when he has family visit so that they can watch him.    Wants to discuss most recent labs from 6/3/19 revealing hyperlipidemia, hypertriglyceridemia, normal PSA, mild hyperglycemia on lipid panel, PSA, CMP.     Has always struggled with a mild amount of anxiety, he does notice that he might be a little more irritable than previous    ROS:  Review of Systems   Constitutional: Negative for activity change and fatigue.   Respiratory: Negative.    Cardiovascular: Negative.    Psychiatric/Behavioral: Positive for agitation and behavioral problems. The patient is nervous/anxious.        Mr. Ramirez  reports that he quit smoking about 34 years ago. His smoking use included cigarettes. His smokeless tobacco use includes chew and snuff. He reports that he does not drink alcohol or use drugs.      Current Outpatient Medications:   •  amLODIPine (NORVASC) 5 MG tablet, Take 1 tablet by mouth Daily., Disp: 30 tablet, Rfl: 1  •  naproxen (NAPROSYN) 500 MG tablet, Take 1 tablet by mouth 2 (Two) Times a Day As Needed for Mild Pain ., Disp: 30 tablet, Rfl: 0      OBJECTIVE:  /98 (BP Location: Left arm, Patient Position: Sitting, Cuff Size: Adult)   Pulse 68   Temp 98.5 °F (36.9 °C) (Temporal)   Resp 12   Ht 172.7 cm (68\")   Wt 63 kg (139 lb)   SpO2 98%   BMI 21.13 kg/m²    Physical Exam   Constitutional: He is oriented to person, place, and time. No distress.   HENT:   Head: Normocephalic and atraumatic.   Nose: Nose normal.   Eyes: Conjunctivae are normal. Right eye exhibits no discharge. Left eye exhibits no discharge. No scleral " icterus.   Neck: No tracheal deviation present.   Cardiovascular: Normal rate, regular rhythm and normal heart sounds. Exam reveals no gallop and no friction rub.   No murmur heard.  Pulmonary/Chest: Effort normal and breath sounds normal. No respiratory distress. He has no wheezes. He has no rales.   Neurological: He is alert and oriented to person, place, and time.   Skin: Skin is warm and dry. He is not diaphoretic. No pallor.   Psychiatric: He has a normal mood and affect. His behavior is normal. Judgment and thought content normal.   Nursing note and vitals reviewed.      Assessment/Plan     Diagnosis Plan   1. Essential hypertension     2. Hyperlipidemia, unspecified hyperlipidemia type     3. Anxiety     -Patient plans to start amlodipine in a few weeks  -Declines cholesterol medication assistance  -Does not want to start a medication for mood, will let us know if his agitation continues to worsen    Patient's Body mass index is 21.13 kg/m². BMI is within normal parameters. No follow-up required..      An After Visit Summary was printed and given to the patient at discharge.  Return in about 3 months (around 12/17/2019) for nurse BP check 4 weeks after starting blood pressure medicine. Sooner if problems arise.         Mike Villa D.O.  Family Medicine  Osteopathic Neuromusculoskeletal Medicine

## 2021-11-16 ENCOUNTER — OFFICE VISIT (OUTPATIENT)
Dept: FAMILY MEDICINE CLINIC | Facility: CLINIC | Age: 76
End: 2021-11-16

## 2021-11-16 ENCOUNTER — TELEPHONE (OUTPATIENT)
Dept: FAMILY MEDICINE CLINIC | Facility: CLINIC | Age: 76
End: 2021-11-16

## 2021-11-16 VITALS
DIASTOLIC BLOOD PRESSURE: 92 MMHG | BODY MASS INDEX: 23.14 KG/M2 | RESPIRATION RATE: 20 BRPM | HEART RATE: 79 BPM | WEIGHT: 144 LBS | OXYGEN SATURATION: 97 % | TEMPERATURE: 98 F | SYSTOLIC BLOOD PRESSURE: 167 MMHG | HEIGHT: 66 IN

## 2021-11-16 DIAGNOSIS — H60.312 ACUTE DIFFUSE OTITIS EXTERNA OF LEFT EAR: Primary | ICD-10-CM

## 2021-11-16 DIAGNOSIS — H81.12 BPV (BENIGN POSITIONAL VERTIGO), LEFT: ICD-10-CM

## 2021-11-16 PROCEDURE — 99213 OFFICE O/P EST LOW 20 MIN: CPT | Performed by: PEDIATRICS

## 2021-11-16 RX ORDER — LISINOPRIL 10 MG/1
10 TABLET ORAL DAILY
COMMUNITY
Start: 2021-09-07 | End: 2022-05-02 | Stop reason: SDUPTHER

## 2021-11-16 RX ORDER — AMOXICILLIN 500 MG/1
500 CAPSULE ORAL 3 TIMES DAILY
Qty: 30 CAPSULE | Refills: 0 | Status: SHIPPED | OUTPATIENT
Start: 2021-11-16 | End: 2022-05-02

## 2021-11-16 RX ORDER — MECLIZINE HYDROCHLORIDE 25 MG/1
25 TABLET ORAL 3 TIMES DAILY PRN
Qty: 30 TABLET | Refills: 0 | Status: SHIPPED | OUTPATIENT
Start: 2021-11-16 | End: 2022-11-10

## 2021-11-16 RX ORDER — NEOMYCIN SULFATE, POLYMYXIN B SULFATE, HYDROCORTISONE 3.5; 10000; 1 MG/ML; [USP'U]/ML; MG/ML
3 SOLUTION/ DROPS AURICULAR (OTIC) 4 TIMES DAILY
Qty: 10 ML | Refills: 0 | Status: SHIPPED | OUTPATIENT
Start: 2021-11-16 | End: 2022-11-10

## 2021-11-16 NOTE — PROGRESS NOTES
"Chief Complaint  Dizziness and inner ear (left)    Subjective    History of Present Illness      Patient presents to [facility] for   Pt c/o dizziness and inner ear to left ear x 1 month ago.     Dizziness  This is a new problem. The current episode started 1 to 4 weeks ago. The problem occurs intermittently. The problem has been gradually worsening.        Review of Systems   Neurological: Positive for dizziness.       I have reviewed and agree with the HPI and ROS information as above.  Avinash Nayak MD     Objective   Vital Signs:   /92   Pulse 79   Temp 98 °F (36.7 °C)   Resp 20   Ht 167.6 cm (66\")   Wt 65.3 kg (144 lb)   SpO2 97%   BMI 23.24 kg/m²       Physical Exam  Constitutional:       Appearance: Normal appearance. He is normal weight.   HENT:      Left Ear: Drainage present.   Cardiovascular:      Rate and Rhythm: Normal rate and regular rhythm.      Heart sounds: Normal heart sounds.   Pulmonary:      Effort: Pulmonary effort is normal.      Breath sounds: Normal breath sounds.   Neurological:      Mental Status: He is alert.   Psychiatric:         Mood and Affect: Mood normal.         Behavior: Behavior normal.          Result Review  Data Reviewed:                   Assessment and Plan      Problem List Items Addressed This Visit        ENT    Acute diffuse otitis externa of left ear - Primary    Relevant Medications    amoxicillin (AMOXIL) 500 MG capsule    neomycin-polymyxin-hydrocortisone (CORTISPORIN) 1 % solution otic solution    BPV (benign positional vertigo), left    Relevant Medications    meclizine (ANTIVERT) 25 MG tablet    Other Relevant Orders    Ambulatory Referral to Physical Therapy Vestibular (Completed)              Follow Up   Return if symptoms worsen or fail to improve.  Patient was given instructions and counseling regarding his condition or for health maintenance advice. Please see specific information pulled into the AVS if " appropriate.

## 2021-11-16 NOTE — TELEPHONE ENCOUNTER
Per Dr Nayak see if patient can get in to see Amado at [facility] [employer], patient doesn't have car and walks and needs somewhere close to treat his vertigo.  Contacted Amado and he will see patient today.  Discussed with patient, and he feels its too far for him to walk at this time.  He will take prescription medications and if he doesn't improve he will return.  I called Amado back and canceled today's appointment.

## 2022-04-27 ENCOUNTER — TELEPHONE (OUTPATIENT)
Dept: FAMILY MEDICINE CLINIC | Facility: CLINIC | Age: 77
End: 2022-04-27

## 2022-04-27 NOTE — TELEPHONE ENCOUNTER
Tried to call patient. He thought he had his Medicare Wellness Exam with Dr. Hill in March 2022. I called Dr. Hill's office to confirm and they state that was not his Medicare Wellness Exam. He needs to schedule his Medicare Wellness Exam.

## 2022-05-02 ENCOUNTER — TELEPHONE (OUTPATIENT)
Dept: FAMILY MEDICINE CLINIC | Facility: CLINIC | Age: 77
End: 2022-05-02

## 2022-05-02 ENCOUNTER — HOSPITAL ENCOUNTER (OUTPATIENT)
Dept: ULTRASOUND IMAGING | Facility: HOSPITAL | Age: 77
Discharge: HOME | End: 2022-05-02

## 2022-05-02 ENCOUNTER — LAB (OUTPATIENT)
Dept: LAB | Facility: HOSPITAL | Age: 77
End: 2022-05-02

## 2022-05-02 ENCOUNTER — OFFICE VISIT (OUTPATIENT)
Dept: FAMILY MEDICINE CLINIC | Facility: CLINIC | Age: 77
End: 2022-05-02

## 2022-05-02 VITALS
DIASTOLIC BLOOD PRESSURE: 72 MMHG | SYSTOLIC BLOOD PRESSURE: 150 MMHG | RESPIRATION RATE: 18 BRPM | WEIGHT: 143 LBS | HEIGHT: 66 IN | BODY MASS INDEX: 22.98 KG/M2 | TEMPERATURE: 97.3 F | OXYGEN SATURATION: 99 % | HEART RATE: 70 BPM

## 2022-05-02 DIAGNOSIS — Z00.00 MEDICARE ANNUAL WELLNESS VISIT, SUBSEQUENT: ICD-10-CM

## 2022-05-02 DIAGNOSIS — I10 ESSENTIAL HYPERTENSION: ICD-10-CM

## 2022-05-02 DIAGNOSIS — Z12.5 SCREENING FOR PROSTATE CANCER: ICD-10-CM

## 2022-05-02 DIAGNOSIS — M79.661 RIGHT CALF PAIN: ICD-10-CM

## 2022-05-02 DIAGNOSIS — I73.9 RIGHT LEG CLAUDICATION: ICD-10-CM

## 2022-05-02 DIAGNOSIS — E78.5 HYPERLIPIDEMIA, UNSPECIFIED HYPERLIPIDEMIA TYPE: ICD-10-CM

## 2022-05-02 DIAGNOSIS — Z00.00 MEDICARE ANNUAL WELLNESS VISIT, SUBSEQUENT: Primary | ICD-10-CM

## 2022-05-02 LAB
ALBUMIN SERPL-MCNC: 4.7 G/DL (ref 3.5–5)
ALBUMIN/GLOB SERPL: 1.7 G/DL (ref 1.1–2.5)
ALP SERPL-CCNC: 63 U/L (ref 24–120)
ALT SERPL W P-5'-P-CCNC: 14 U/L (ref 0–50)
ANION GAP SERPL CALCULATED.3IONS-SCNC: 0 MMOL/L (ref 4–13)
AST SERPL-CCNC: 20 U/L (ref 7–45)
AUTO MIXED CELLS #: 0.7 10*3/MM3 (ref 0.1–2.6)
AUTO MIXED CELLS %: 11.8 % (ref 0.1–24)
BILIRUB SERPL-MCNC: 0.7 MG/DL (ref 0.1–1)
BILIRUB UR QL STRIP: NEGATIVE
BUN SERPL-MCNC: 10 MG/DL (ref 5–21)
BUN/CREAT SERPL: 12.5
CALCIUM SPEC-SCNC: 10.8 MG/DL (ref 8.4–10.4)
CHLORIDE SERPL-SCNC: 106 MMOL/L (ref 98–110)
CHOLEST SERPL-MCNC: 224 MG/DL (ref 130–200)
CLARITY UR: CLEAR
CO2 SERPL-SCNC: 34 MMOL/L (ref 24–31)
COLOR UR: YELLOW
CREAT SERPL-MCNC: 0.8 MG/DL (ref 0.5–1.4)
EGFRCR SERPLBLD CKD-EPI 2021: 91.7 ML/MIN/1.73
ERYTHROCYTE [DISTWIDTH] IN BLOOD BY AUTOMATED COUNT: 13 % (ref 12.3–15.4)
GLOBULIN UR ELPH-MCNC: 2.7 GM/DL
GLUCOSE SERPL-MCNC: 101 MG/DL (ref 70–100)
GLUCOSE UR STRIP-MCNC: NEGATIVE MG/DL
HCT VFR BLD AUTO: 43 % (ref 37.5–51)
HDLC SERPL-MCNC: 58 MG/DL
HGB BLD-MCNC: 14.3 G/DL (ref 13–17.7)
HGB UR QL STRIP.AUTO: NEGATIVE
KETONES UR QL STRIP: NEGATIVE
LDLC SERPL CALC-MCNC: 147 MG/DL (ref 0–99)
LDLC/HDLC SERPL: 2.49 {RATIO}
LEUKOCYTE ESTERASE UR QL STRIP.AUTO: NEGATIVE
LYMPHOCYTES # BLD AUTO: 1.5 10*3/MM3 (ref 0.7–3.1)
LYMPHOCYTES NFR BLD AUTO: 26 % (ref 19.6–45.3)
MCH RBC QN AUTO: 28.1 PG (ref 26.6–33)
MCHC RBC AUTO-ENTMCNC: 33.3 G/DL (ref 31.5–35.7)
MCV RBC AUTO: 84.6 FL (ref 79–97)
NEUTROPHILS NFR BLD AUTO: 3.6 10*3/MM3 (ref 1.7–7)
NEUTROPHILS NFR BLD AUTO: 62.2 % (ref 42.7–76)
NITRITE UR QL STRIP: NEGATIVE
PH UR STRIP.AUTO: 7.5 [PH] (ref 5–8)
PLATELET # BLD AUTO: 182 10*3/MM3 (ref 140–450)
PMV BLD AUTO: 9.2 FL (ref 6–12)
POTASSIUM SERPL-SCNC: 4.5 MMOL/L (ref 3.5–5.3)
PROT SERPL-MCNC: 7.4 G/DL (ref 6.3–8.7)
PROT UR QL STRIP: NEGATIVE
RBC # BLD AUTO: 5.08 10*6/MM3 (ref 4.14–5.8)
SODIUM SERPL-SCNC: 140 MMOL/L (ref 135–145)
SP GR UR STRIP: <=1.005 (ref 1–1.03)
TRIGL SERPL-MCNC: 108 MG/DL (ref 0–149)
UROBILINOGEN UR QL STRIP: NORMAL
VLDLC SERPL-MCNC: 19 MG/DL (ref 5–40)
WBC NRBC COR # BLD: 5.8 10*3/MM3 (ref 3.4–10.8)

## 2022-05-02 PROCEDURE — 86803 HEPATITIS C AB TEST: CPT

## 2022-05-02 PROCEDURE — 1160F RVW MEDS BY RX/DR IN RCRD: CPT | Performed by: PEDIATRICS

## 2022-05-02 PROCEDURE — 93923 UPR/LXTR ART STDY 3+ LVLS: CPT

## 2022-05-02 PROCEDURE — G0439 PPPS, SUBSEQ VISIT: HCPCS | Performed by: PEDIATRICS

## 2022-05-02 PROCEDURE — 81003 URINALYSIS AUTO W/O SCOPE: CPT

## 2022-05-02 PROCEDURE — G0103 PSA SCREENING: HCPCS

## 2022-05-02 PROCEDURE — 80061 LIPID PANEL: CPT

## 2022-05-02 PROCEDURE — 99214 OFFICE O/P EST MOD 30 MIN: CPT | Performed by: PEDIATRICS

## 2022-05-02 PROCEDURE — 1170F FXNL STATUS ASSESSED: CPT | Performed by: PEDIATRICS

## 2022-05-02 PROCEDURE — 36415 COLL VENOUS BLD VENIPUNCTURE: CPT

## 2022-05-02 PROCEDURE — 80053 COMPREHEN METABOLIC PANEL: CPT

## 2022-05-02 PROCEDURE — 85025 COMPLETE CBC W/AUTO DIFF WBC: CPT

## 2022-05-02 RX ORDER — LISINOPRIL 10 MG/1
10 TABLET ORAL DAILY
Qty: 90 TABLET | Refills: 1 | Status: SHIPPED | OUTPATIENT
Start: 2022-05-02 | End: 2022-12-05 | Stop reason: SDUPTHER

## 2022-05-02 NOTE — ASSESSMENT & PLAN NOTE
Hypertension is unchanged.  Continue current treatment regimen.  Blood pressure will be reassessed one year.

## 2022-05-02 NOTE — PROGRESS NOTES
The ABCs of the Annual Wellness Visit  Subsequent Medicare Wellness Visit    Chief Complaint   Patient presents with   • Medicare Wellness-subsequent   • Leg Pain     h   • Hypertension      Annual Medicare Wellness  Subjective    History of Present Illness:  Gerard Ramirez is a 76 y.o. male who presents for a Subsequent Medicare Wellness Visit.    hpi /  He also complains of calf pain on right  That wakes him up and starts after walking. Follow up his htn and chol and med refill also requested.    The following portions of the patient's history were reviewed and   updated as appropriate: allergies, current medications, past family history, past medical history, past social history, past surgical history and problem list.    Compared to one year ago, the patient feels his physical   health is better.    Compared to one year ago, the patient feels his mental   health is better.    Recent Hospitalizations:  He was not admitted to the hospital during the last year.       Current Medical Providers:  Patient Care Team:  Avinash Nayak MD as PCP - General (Pediatrics)  Valdo Mendez MD as Consulting Physician (Urology)    Outpatient Medications Prior to Visit   Medication Sig Dispense Refill   • lisinopril (PRINIVIL,ZESTRIL) 10 MG tablet Take 10 mg by mouth Daily.     • meclizine (ANTIVERT) 25 MG tablet Take 1 tablet by mouth 3 (Three) Times a Day As Needed for Dizziness. 30 tablet 0   • neomycin-polymyxin-hydrocortisone (CORTISPORIN) 1 % solution otic solution Administer 3 drops into the left ear 4 (Four) Times a Day. 10 mL 0   • amoxicillin (AMOXIL) 500 MG capsule Take 1 capsule by mouth 3 (Three) Times a Day. 30 capsule 0     No facility-administered medications prior to visit.       No opioid medication identified on active medication list. I have reviewed chart for other potential  high risk medication/s and harmful drug interactions in the elderly.          Aspirin is not on active medication list.  Aspirin use  "is not indicated based on review of current medical condition/s. Risk of harm outweighs potential benefits.  .    Patient Active Problem List   Diagnosis   • Encounter for colonoscopy due to history of adenomatous colonic polyps   • Family history of colonic polyps   • GERD (gastroesophageal reflux disease)   • Allergic rhinitis   • Essential hypertension   • Hyperlipidemia   • Anxiety   • Acute diffuse otitis externa of left ear   • BPV (benign positional vertigo), left   • Medicare annual wellness visit, subsequent   • Right calf pain   • Screening for prostate cancer   • Right leg claudication (HCC)     Advance Care Planning  Advance Directive is not on file.  ACP discussion was held with the patient during this visit. Patient does not have an advance directive, information provided.    Review of Systems   Musculoskeletal: Positive for myalgias.   All other systems reviewed and are negative.       Objective    Vitals:    05/02/22 1136   BP: 150/72  Comment: MANUAL   Pulse: 70   Resp: 18   Temp: 97.3 °F (36.3 °C)   SpO2: 99%   Weight: 64.9 kg (143 lb)   Height: 167.6 cm (65.98\")     BMI Readings from Last 1 Encounters:   05/10/22 23.08 kg/m²   BMI is within normal parameters. No follow-up required.    Does the patient have evidence of cognitive impairment? No  Comprehensive Metabolic Panel (06/03/2019 12:32)  Lipid panel (06/03/2019 12:32)  PSA SCREENING (06/03/2019 12:32)    Physical Exam  Constitutional:       Appearance: Normal appearance. He is normal weight.   Cardiovascular:      Rate and Rhythm: Normal rate and regular rhythm.      Pulses: Decreased pulses.           Popliteal pulses are 0 on the right side and 1+ on the left side.        Dorsalis pedis pulses are 0 on the right side.        Posterior tibial pulses are 0 on the right side.      Heart sounds: Normal heart sounds.      Comments: No hair lower extremity    bilat varicose veins  Pulmonary:      Effort: Pulmonary effort is normal.      Breath " sounds: Normal breath sounds.   Musculoskeletal:        Legs:    Neurological:      Mental Status: He is alert.   Psychiatric:         Mood and Affect: Mood normal.         Behavior: Behavior normal.       Lab Results   Component Value Date    TRIG 108 2022    HDL 58 2022     (H) 2022    VLDL 19 2022            HEALTH RISK ASSESSMENT    Smoking Status:  Social History     Tobacco Use   Smoking Status Former Smoker   • Types: Cigarettes   • Quit date:    • Years since quittin.3   Smokeless Tobacco Current User   • Types: Chew, Snuff   Tobacco Comment    Patient only smokes occassionally     Alcohol Consumption:  Social History     Substance and Sexual Activity   Alcohol Use No     Fall Risk Screen:    STEADI Fall Risk Assessment was completed, and patient is at LOW risk for falls.Assessment completed on:2022    Depression Screening:  PHQ-2/PHQ-9 Depression Screening 2022   Little Interest or Pleasure in Doing Things 0-->not at all   Feeling Down, Depressed or Hopeless 0-->not at all   PHQ-9: Brief Depression Severity Measure Score 0       Health Habits and Functional and Cognitive Screening:  Functional & Cognitive Status 2022   Do you have difficulty preparing food and eating? No   Do you have difficulty bathing yourself, getting dressed or grooming yourself? No   Do you have difficulty using the toilet? No   Do you have difficulty moving around from place to place? No   Do you have trouble with steps or getting out of a bed or a chair? No   Current Diet Well Balanced Diet        Current Diet Comment vegetarian   Dental Exam Other        Dental Exam Comment dentures   Eye Exam Not up to date   Exercise (times per week) 10 times per week   Current Exercises Include Walking   Do you need help using the phone?  No   Are you deaf or do you have serious difficulty hearing?  No   Do you need help with transportation? No   Do you need help shopping? No   Do you need help  preparing meals?  No   Do you need help with housework?  No   Do you need help with laundry? No   Do you need help taking your medications? No   Do you need help managing money? No   Do you ever drive or ride in a car without wearing a seat belt? No   Have you felt unusual stress, anger or loneliness in the last month? No   Who do you live with? Alone   If you need help, do you have trouble finding someone available to you? Yes   Have you been bothered in the last four weeks by sexual problems? No   Do you have difficulty concentrating, remembering or making decisions? No       Age-appropriate Screening Schedule:  Refer to the list below for future screening recommendations based on patient's age, sex and/or medical conditions. Orders for these recommended tests are listed in the plan section. The patient has been provided with a written plan.    Health Maintenance   Topic Date Due   • ZOSTER VACCINE (1 of 2) Never done   • INFLUENZA VACCINE  08/01/2022   • LIPID PANEL  05/02/2023   • TDAP/TD VACCINES (2 - Td or Tdap) 08/20/2029              Assessment & Plan   CMS Preventative Services Quick Reference  Risk Factors Identified During Encounter  None Identified  The above risks/problems have been discussed with the patient.  Follow up actions/plans if indicated are seen below in the Assessment/Plan Section.  Pertinent information has been shared with the patient in the After Visit Summary.    Diagnoses and all orders for this visit:    1. Medicare annual wellness visit, subsequent (Primary)  Assessment & Plan:  Discussed safety with fall risk, nutrition, balance    Orders:  -     Hepatitis C antibody; Future  -     CBC Auto Differential; Future  -     Comprehensive Metabolic Panel; Future  -     Lipid Panel; Future  -     Urinalysis With Culture If Indicated - Urine, Clean Catch; Future    2. Right calf pain  Assessment & Plan:  Like a charley horse  Seem to start after covid shot.    Orders:  -     US Ankle /  Brachial Indices Extremity Complete; Future    3. Essential hypertension  Assessment & Plan:  Hypertension is unchanged.  Continue current treatment regimen.  Blood pressure will be reassessed one year.    Orders:  -     lisinopril (PRINIVIL,ZESTRIL) 10 MG tablet; Take 1 tablet by mouth Daily.  Dispense: 90 tablet; Refill: 1    4. Hyperlipidemia, unspecified hyperlipidemia type  Assessment & Plan:  Lipid abnormalities are improving with treatment.  Nutritional counseling was provided.  Lipids will be reassessed in 1 year.      5. Screening for prostate cancer  -     PSA Screen; Future    6. Right leg claudication (HCC)  Assessment & Plan:  Pain after walking in r leg    Orders:  -     US Ankle / Brachial Indices Extremity Complete; Future      Follow Up:   Return in about 3 months (around 8/2/2022).     An After Visit Summary and PPPS were made available to the patient.

## 2022-05-02 NOTE — TELEPHONE ENCOUNTER
----- Message from Avinash Nayak MD sent at 5/2/2022  1:15 PM CDT -----  Normal complete blood count

## 2022-05-03 LAB
HCV AB SER DONR QL: NORMAL
PSA SERPL-MCNC: 4.03 NG/ML (ref 0–4)

## 2022-05-04 ENCOUNTER — TELEPHONE (OUTPATIENT)
Dept: FAMILY MEDICINE CLINIC | Facility: CLINIC | Age: 77
End: 2022-05-04

## 2022-05-04 DIAGNOSIS — M79.661 RIGHT CALF PAIN: Primary | ICD-10-CM

## 2022-05-04 NOTE — TELEPHONE ENCOUNTER
----- Message from Avinash Nayak MD sent at 5/3/2022  5:03 PM CDT -----  PSA has doubled since last year is over 4.  A urology consult would be appropriate he does not have a urologist Dr. Cagle

## 2022-05-04 NOTE — TELEPHONE ENCOUNTER
Addended by: FRANCISCO JAVIER GARCIA on: 6/23/2021 02:57 PM     Modules accepted: Orders     Pt aware of results and states he has seen Dr. Mendez in the past and will call his office to see if he needs follow up.

## 2022-05-04 NOTE — TELEPHONE ENCOUNTER
----- Message from Avinash Nayak MD sent at 5/3/2022  5:04 PM CDT -----  There is concerning finding of his right leg with the vascular narrowing and needs to see Dr. Castillo and vascular surgery please make that referral

## 2022-05-05 ENCOUNTER — TELEPHONE (OUTPATIENT)
Dept: FAMILY MEDICINE CLINIC | Facility: CLINIC | Age: 77
End: 2022-05-05

## 2022-05-05 DIAGNOSIS — R97.20 ELEVATED PSA: Primary | ICD-10-CM

## 2022-05-05 NOTE — TELEPHONE ENCOUNTER
Called pt back at home and he states that Dr. Castillo's office called him with an apt and he has canceled that apt. Pt states he feels that he does not need vascular referral at this time because that office told him he had only a slight narrowing noted on ultrasound. Pt states he is not having any new problems to leg area and just does not want referral at this time. Explained in depth that we encouraged this referral to prevent any complications in that leg but he did not agree to apt. Pt states he will call us if he decides to do referral. Pt also states that he talked with Dr. Mendez's office and he does need new referral to that office. Referral in chart.

## 2022-05-05 NOTE — TELEPHONE ENCOUNTER
Caller: Gerard Ramirez    Relationship: Self    Best call back number: [phone]    What is the best time to reach you: ANY     Who are you requesting to speak with (clinical staff, provider,  specific staff member): LISA NUÑEZ     What was the call regarding: PATIENT WOULD LIKE TO SPEAK WITH LISA FURTHER  REGARDING THE CONVERSATIONS FROM YESTERDAY 5/4/22.     Do you require a callback: YES.

## 2022-05-06 NOTE — PROGRESS NOTES
"Chief Complaint  Elevated PSA    Subjective          Gerard Ramirez presents to [facility] for elevated PSA.  This gentleman I saw back in 2017 for PSA that went up to 4.3 but then on recheck decreased to 1.7.  He returns today with a PSA of 4.03.  This was done in the context of prostate cancer screening.        Current Outpatient Medications:   •  lisinopril (PRINIVIL,ZESTRIL) 10 MG tablet, Take 1 tablet by mouth Daily., Disp: 90 tablet, Rfl: 1  •  meclizine (ANTIVERT) 25 MG tablet, Take 1 tablet by mouth 3 (Three) Times a Day As Needed for Dizziness., Disp: 30 tablet, Rfl: 0  •  neomycin-polymyxin-hydrocortisone (CORTISPORIN) 1 % solution otic solution, Administer 3 drops into the left ear 4 (Four) Times a Day., Disp: 10 mL, Rfl: 0  Past Medical History:   Diagnosis Date   • Anxiety    • Decreased libido    • Elevated PSA    • Erectile dysfunction    • GERD (gastroesophageal reflux disease)    • Sinus problem      Past Surgical History:   Procedure Laterality Date   • CIRCUMCISION     • FOOT SURGERY Left    • TONSILLECTOMY             Review  of systems  Constitutional: Negative for chills or fever.   Gastrointestinal: Negative for abdominal pain, anal bleeding or blood in stool.           Objective   PHYSICAL EXAM  Vital Signs:   Temp 98.2 °F (36.8 °C)   Ht 167.6 cm (66\")   Wt 64.9 kg (143 lb)   BMI 23.08 kg/m²     Constitutional: Patient is without distress or deformity.  Vital signs are reviewed as above.    Neuro: No confusion; No disorientation; Alert and oriented  Pulmonary: No respiratory distress.   Skin: No pallor or diaphoresis  BLAINE: 20 ml prostate is smooth and symmetric    DATA  Result Review :              Results for orders placed or performed in visit on 05/10/22   POC Urinalysis Dipstick, Multipro    Specimen: Urine   Result Value Ref Range    Color Yellow Yellow, Straw, Dark Yellow, Ava    Clarity, UA Clear Clear    Glucose, UA Negative Negative, 1000 mg/dL (3+) " mg/dL    Bilirubin Negative Negative    Ketones, UA Negative Negative    Specific Gravity  1.010 1.005 - 1.030    Blood, UA Negative Negative    pH, Urine 6.5 5.0 - 8.0    Protein, POC Negative Negative mg/dL    Urobilinogen, UA Normal Normal    Nitrite, UA Negative Negative    Leukocytes Negative Negative     Lab Results   Component Value Date    PSA 4.030 (H) 05/02/2022    PSA 1.810 06/03/2019    PSA  PSA  PSA 1.730  4.3  1.78 06/04/2018  11/27/2017  10/28/2015                      ASSESSMENT AND PLAN          Problem List Items Addressed This Visit    None     Visit Diagnoses     Elevated PSA    -  Primary    Relevant Orders    POC Urinalysis Dipstick, Multipro (Completed)    PSA DIAGNOSTIC      I discussed the following important considerations with the patient. Current American Urologic Association prostate cancer screening recommendations include informed decision making on the pros and cons of prostate cancer between the ages of 50-> 69 years old.   They do not recommend routine PSA screening in men older than 70 or any man with less than a 10 to 15-year life expectancy.  The panel also noted that some men older than 70 years are in excellent health and may benefit from prostate cancer screening.  However the likelihood of over diagnosis increases as men age, and is particularly high for older men with low risk disease.  Men over 70 who wished to be screened should do so after understanding that the benefit decreases while the harm increases with age, although there is evidence that men with high risk disease in this age group may benefit from early diagnosis and treatment over a decade or less.    I discussed the pros and cons of biopsy with him as follows:  -Screening does not lower the risk of having prostate cancer; it increases the chance you will find out to have it.  -PSA testing can detect early stage cancers that a digital rectal exam would miss.  -A normal PSA level of 4 or below does not guarantee  a patient is cancer free; e.g. about 15% of men with a PSA below 4, a biopsy will reveal prostate cancer  -High PSA level may prompt a patient to seek treatment, resulting in possible urinary and sexual side effects.  -Conditions other than cancer-BPH and prostatitis, for example-can elevate PSA    -Screening may lead to a biopsy which may:  *provide a false negative result (approximately 10% of patients diagnosed with cancer have a history of negative biopsy)  *diagnose a prostate cancer that will never lead to symptoms or death even without treatment so some patients have unnecessary treatment  *side effects that can be severe such as sepsis from infection, bleeding, and even transient sexual dysfunction      We also discussed other societal recommendations for cancer screening such as:  -American Cancer Society recommends informed decision on asymptomatic men with greater than 10-year life expectancy.  -The American Academy of Family Practice recommends informed decision making with men between 55 and 69 years old but there is agreement that men age 70 or older should not be screened for prostate cancer.  -The National Cancer Care Network agrees that PSA testing should only be offered to men with  10 or more year life expectancy.  This panel supported screening in men until age 75.  Continued screening beyond 75 should be only with caution and very healthy patients with little to no comorbidity to detect a small number of aggressive cancers that pose a significant risk if left untreated.   \  Patient is quite healthy for his age.  He would like to at least continue to follow the PSA.  He is not in favor being aggressive.  His PSA can be repeated in 6 months      FOLLOW UP     Return in about 6 months (around 11/10/2022).        (Please note that portions of this note were completed with a voice recognition program.)  Valdo Mendez MD  05/10/22  11:25 CDT

## 2022-05-10 ENCOUNTER — OFFICE VISIT (OUTPATIENT)
Dept: UROLOGY | Facility: CLINIC | Age: 77
End: 2022-05-10

## 2022-05-10 VITALS — BODY MASS INDEX: 22.98 KG/M2 | WEIGHT: 143 LBS | TEMPERATURE: 98.2 F | HEIGHT: 66 IN

## 2022-05-10 DIAGNOSIS — R97.20 ELEVATED PSA: Primary | ICD-10-CM

## 2022-05-10 LAB
BILIRUB BLD-MCNC: NEGATIVE MG/DL
CLARITY, POC: CLEAR
COLOR UR: YELLOW
GLUCOSE UR STRIP-MCNC: NEGATIVE MG/DL
KETONES UR QL: NEGATIVE
LEUKOCYTE EST, POC: NEGATIVE
NITRITE UR-MCNC: NEGATIVE MG/ML
PH UR: 6.5 [PH] (ref 5–8)
PROT UR STRIP-MCNC: NEGATIVE MG/DL
RBC # UR STRIP: NEGATIVE /UL
SP GR UR: 1.01 (ref 1–1.03)
UROBILINOGEN UR QL: NORMAL

## 2022-05-10 PROCEDURE — 99213 OFFICE O/P EST LOW 20 MIN: CPT | Performed by: UROLOGY

## 2022-05-10 PROCEDURE — 81001 URINALYSIS AUTO W/SCOPE: CPT | Performed by: UROLOGY

## 2022-11-08 NOTE — PROGRESS NOTES
Chief Complaint  PSA elevation    Subjective          Gerard Ramirez presents to [facility] [place]   Hx elevated PSA  In 2017 I saw for PSA which was over 4, only to be <2 on repeat a few months later.   I saw again six months ago for PSA that went up to 4.03.         Current Outpatient Medications:   •  lisinopril (PRINIVIL,ZESTRIL) 10 MG tablet, Take 1 tablet by mouth Daily., Disp: 90 tablet, Rfl: 1  •  meclizine (ANTIVERT) 25 MG tablet, Take 1 tablet by mouth 3 (Three) Times a Day As Needed for Dizziness., Disp: 30 tablet, Rfl: 0  •  neomycin-polymyxin-hydrocortisone (CORTISPORIN) 1 % solution otic solution, Administer 3 drops into the left ear 4 (Four) Times a Day., Disp: 10 mL, Rfl: 0  Past Medical History:   Diagnosis Date   • Anxiety    • Decreased libido    • Elevated PSA    • Erectile dysfunction    • GERD (gastroesophageal reflux disease)    • Sinus problem      Past Surgical History:   Procedure Laterality Date   • CIRCUMCISION     • FOOT SURGERY Left    • TONSILLECTOMY             Review of Systems  Review  of systems  Constitutional: Negative for chills and fever.   Gastrointestinal: Negative for abdominal pain, anal bleeding and blood in stool.   Genitourinary: Negative for flank pain and hematuria.      Objective   PHYSICAL EXAM  Vital Signs:   There were no vitals taken for this visit.    Physical Exam  Constitutional: Patient is without distress or deformity.  Vital signs are reviewed as above.    Neuro: No confusion; No disorientation; Alert and oriented  Pulmonary: No respiratory distress.   Skin: No pallor or diaphoresis        DATA  Result Review :              Results for orders placed or performed in visit on 10/27/22   PSA DIAGNOSTIC    Specimen: Blood   Result Value Ref Range    PSA 2.220 0.000 - 4.000 ng/mL             Lab Results   Component Value Date    PSA 2.220 11/03/2022    PSA 4.030 (H) 05/02/2022    PSA 1.810 06/03/2019        ASSESSMENT AND PLAN           Problem List Items Addressed This Visit    None  Visit Diagnoses     Elevated PSA    -  Primary    Relevant Orders    POC Urinalysis Dipstick, Multipro      PSA improved again with watchful waiting. Will repeat in one year. If normal, would DISCONTINUE further PCa screening.         FOLLOW UP     No follow-ups on file.        (Please note that portions of this note were completed with a voice recognition program.)  Valdo Mendez MD  11/09/22  16:32 CST

## 2022-11-10 ENCOUNTER — OFFICE VISIT (OUTPATIENT)
Dept: UROLOGY | Facility: CLINIC | Age: 77
End: 2022-11-10

## 2022-11-10 VITALS — WEIGHT: 147 LBS | HEIGHT: 68 IN | BODY MASS INDEX: 22.28 KG/M2 | TEMPERATURE: 96.8 F

## 2022-11-10 DIAGNOSIS — R97.20 ELEVATED PSA: Primary | ICD-10-CM

## 2022-11-10 PROCEDURE — 81003 URINALYSIS AUTO W/O SCOPE: CPT | Performed by: UROLOGY

## 2022-11-10 PROCEDURE — 99213 OFFICE O/P EST LOW 20 MIN: CPT | Performed by: UROLOGY

## 2022-12-03 DIAGNOSIS — I10 ESSENTIAL HYPERTENSION: ICD-10-CM

## 2022-12-05 ENCOUNTER — OFFICE VISIT (OUTPATIENT)
Dept: FAMILY MEDICINE CLINIC | Facility: CLINIC | Age: 77
End: 2022-12-05

## 2022-12-05 VITALS
WEIGHT: 149 LBS | TEMPERATURE: 97.1 F | HEART RATE: 71 BPM | RESPIRATION RATE: 18 BRPM | OXYGEN SATURATION: 97 % | BODY MASS INDEX: 22.58 KG/M2 | DIASTOLIC BLOOD PRESSURE: 84 MMHG | SYSTOLIC BLOOD PRESSURE: 157 MMHG | HEIGHT: 68 IN

## 2022-12-05 DIAGNOSIS — R97.20 ABNORMAL PSA: ICD-10-CM

## 2022-12-05 DIAGNOSIS — I10 ESSENTIAL HYPERTENSION: Primary | ICD-10-CM

## 2022-12-05 PROCEDURE — 99214 OFFICE O/P EST MOD 30 MIN: CPT | Performed by: NURSE PRACTITIONER

## 2022-12-05 RX ORDER — LISINOPRIL 10 MG/1
10 TABLET ORAL DAILY
Qty: 90 TABLET | Refills: 1 | Status: SHIPPED | OUTPATIENT
Start: 2022-12-05

## 2022-12-05 RX ORDER — LISINOPRIL 10 MG/1
TABLET ORAL
Qty: 90 TABLET | Refills: 1 | OUTPATIENT
Start: 2022-12-05

## 2022-12-05 NOTE — TELEPHONE ENCOUNTER
Caller: Gerard Ramirez    Relationship: Self    Best call back number: [phone]    Requested Prescriptions:   Requested Prescriptions     Pending Prescriptions Disp Refills   • lisinopril (PRINIVIL,ZESTRIL) 10 MG tablet [Pharmacy Med Name: LISINOPRIL 10 MG TABLET] 90 tablet 1     Sig: TAKE 1 TABLET BY MOUTH EVERY DAY        Pharmacy where request should be sent: [id number] - [place], [place] - [address] [phone] [phone]      Additional details provided by patient: HAS ENOUGH TILL FRIDAY    Does the patient have less than a 3 day supply:  [] Yes  [x] No    Would you like a call back once the refill request has been completed: [x] Yes [] No    If the office needs to give you a call back, can they leave a voicemail: [] Yes [x] No    Destinee Morgan Rep   12/05/22 09:03 CST

## 2022-12-05 NOTE — PROGRESS NOTES
"Chief Complaint  Hypertension and Med Refill    Subjective        Gerard Ramirez presents to [facility]  History of Present Illness  Hypertension follow up. Wants to discuss weaning off medication.            Objective   Vital Signs:  /84   Pulse 71   Temp 97.1 °F (36.2 °C)   Resp 18   Ht 172.7 cm (68\")   Wt 67.6 kg (149 lb)   SpO2 97%   BMI 22.66 kg/m²   Estimated body mass index is 22.66 kg/m² as calculated from the following:    Height as of this encounter: 172.7 cm (68\").    Weight as of this encounter: 67.6 kg (149 lb).    BMI is within normal parameters. No other follow-up for BMI required.      Physical Exam  Constitutional:       Appearance: Normal appearance. He is well-developed.   HENT:      Head: Normocephalic and atraumatic.      Right Ear: Tympanic membrane, ear canal and external ear normal.      Left Ear: Tympanic membrane, ear canal and external ear normal.      Nose: Nose normal. No septal deviation, nasal tenderness or congestion.      Mouth/Throat:      Lips: Pink. No lesions.      Mouth: Mucous membranes are moist. No oral lesions.      Dentition: Normal dentition.      Pharynx: Oropharynx is clear. No pharyngeal swelling, oropharyngeal exudate or posterior oropharyngeal erythema.   Eyes:      General: Lids are normal. Vision grossly intact. No scleral icterus.        Right eye: No discharge.         Left eye: No discharge.      Extraocular Movements: Extraocular movements intact.      Conjunctiva/sclera: Conjunctivae normal.      Right eye: Right conjunctiva is not injected.      Left eye: Left conjunctiva is not injected.      Pupils: Pupils are equal, round, and reactive to light.   Neck:      Thyroid: No thyroid mass.      Trachea: Trachea normal.   Cardiovascular:      Rate and Rhythm: Normal rate and regular rhythm.      Heart sounds: Normal heart sounds. No murmur heard.    No gallop.   Pulmonary:      Effort: Pulmonary effort is normal.      " Breath sounds: Normal breath sounds and air entry. No wheezing, rhonchi or rales.   Abdominal:      General: There is no distension.      Palpations: Abdomen is soft. There is no mass.      Tenderness: There is no abdominal tenderness. There is no right CVA tenderness, left CVA tenderness, guarding or rebound.   Musculoskeletal:         General: No tenderness or deformity. Normal range of motion.      Cervical back: Full passive range of motion without pain, normal range of motion and neck supple.      Thoracic back: Normal.      Right lower leg: No edema.      Left lower leg: No edema.   Skin:     General: Skin is warm and dry.      Coloration: Skin is not jaundiced.      Findings: No rash.   Neurological:      Mental Status: He is alert and oriented to person, place, and time.      Cranial Nerves: Cranial nerves are intact.      Sensory: Sensation is intact.      Motor: Motor function is intact.      Coordination: Coordination is intact.      Gait: Gait is intact.      Deep Tendon Reflexes: Reflexes are normal and symmetric.   Psychiatric:         Mood and Affect: Mood and affect normal.         Judgment: Judgment normal.        Result Review :               CBC Auto Differential (05/02/2022 13:12)  Comprehensive Metabolic Panel (05/02/2022 13:12)  Lipid Panel (05/02/2022 13:12)  PSA Screen (05/02/2022 13:12)  PSA Screen (05/02/2022 13:12)    Assessment and Plan   Diagnoses and all orders for this visit:    1. Essential hypertension (Primary)  -     lisinopril (PRINIVIL,ZESTRIL) 10 MG tablet; Take 1 tablet by mouth Daily.  Dispense: 90 tablet; Refill: 1    2. Abnormal PSA    Patient presents today for hypertension follow-up.  He tells me he is frustrated because he went to his pharmacy this morning and they did not have a refill available so he had to make an unexpected trip here today.  He tells me that he really just wants to discontinue his lisinopril and not take it anymore and he will restart his baby aspirin  a day.  I did heavy counseling with him on his risk involved with uncontrolled hypertension.  Manual recheck did slightly improve in office today.  I discussed with him I would rather increase his lisinopril which he is not agreeable to today even knowing risk of uncontrolled hypertension long-term.  I also discussed with him that I would not encourage a baby aspirin at his age.  His annual labs are up-to-date.  He is asking me how to wean his medication in the future.  Again I have encouraged compliance and monitoring at home, continuing therapy.  Plan:  1.  Refill and continue lisinopril 10 mg only since he is not agreeable to dose changes at this time.  Can cut back on salt and make other diet changes to help with this.  2.  Noted recent abnormal PSA he is following with urology for currently.         Follow Up   Return in about 6 months (around 6/5/2023).  Patient was given instructions and counseling regarding his condition or for health maintenance advice. Please see specific information pulled into the AVS if appropriate.

## 2023-06-01 ENCOUNTER — OFFICE VISIT (OUTPATIENT)
Dept: FAMILY MEDICINE CLINIC | Facility: CLINIC | Age: 78
End: 2023-06-01

## 2023-06-01 ENCOUNTER — LAB (OUTPATIENT)
Dept: LAB | Facility: HOSPITAL | Age: 78
End: 2023-06-01
Payer: MEDICARE

## 2023-06-01 VITALS
OXYGEN SATURATION: 98 % | HEIGHT: 68 IN | DIASTOLIC BLOOD PRESSURE: 68 MMHG | WEIGHT: 143 LBS | HEART RATE: 78 BPM | BODY MASS INDEX: 21.67 KG/M2 | SYSTOLIC BLOOD PRESSURE: 138 MMHG

## 2023-06-01 DIAGNOSIS — Z00.00 MEDICARE ANNUAL WELLNESS VISIT, SUBSEQUENT: ICD-10-CM

## 2023-06-01 DIAGNOSIS — K21.9 GASTROESOPHAGEAL REFLUX DISEASE, UNSPECIFIED WHETHER ESOPHAGITIS PRESENT: ICD-10-CM

## 2023-06-01 DIAGNOSIS — I10 ESSENTIAL HYPERTENSION: ICD-10-CM

## 2023-06-01 DIAGNOSIS — Z12.5 SCREENING FOR MALIGNANT NEOPLASM OF PROSTATE: ICD-10-CM

## 2023-06-01 DIAGNOSIS — Z00.00 MEDICARE ANNUAL WELLNESS VISIT, SUBSEQUENT: Primary | ICD-10-CM

## 2023-06-01 DIAGNOSIS — E78.5 HYPERLIPIDEMIA, UNSPECIFIED HYPERLIPIDEMIA TYPE: ICD-10-CM

## 2023-06-01 LAB
ALBUMIN SERPL-MCNC: 4.4 G/DL (ref 3.5–5)
ALBUMIN/GLOB SERPL: 1.6 G/DL (ref 1.1–2.5)
ALP SERPL-CCNC: 65 U/L (ref 24–120)
ALT SERPL W P-5'-P-CCNC: 21 U/L (ref 0–50)
ANION GAP SERPL CALCULATED.3IONS-SCNC: 5 MMOL/L (ref 4–13)
AST SERPL-CCNC: 24 U/L (ref 7–45)
AUTO MIXED CELLS #: 0.6 10*3/MM3 (ref 0.1–2.6)
AUTO MIXED CELLS %: 9.8 % (ref 0.1–24)
BILIRUB SERPL-MCNC: 0.6 MG/DL (ref 0.1–1)
BILIRUB UR QL STRIP: NEGATIVE
BUN SERPL-MCNC: 13 MG/DL (ref 5–21)
BUN/CREAT SERPL: 14
CALCIUM SPEC-SCNC: 10.1 MG/DL (ref 8.4–10.4)
CHLORIDE SERPL-SCNC: 101 MMOL/L (ref 98–110)
CHOLEST SERPL-MCNC: 230 MG/DL (ref 130–200)
CLARITY UR: CLEAR
CO2 SERPL-SCNC: 34 MMOL/L (ref 24–31)
COLOR UR: YELLOW
CREAT SERPL-MCNC: 0.93 MG/DL (ref 0.5–1.4)
EGFRCR SERPLBLD CKD-EPI 2021: 84.6 ML/MIN/1.73
ERYTHROCYTE [DISTWIDTH] IN BLOOD BY AUTOMATED COUNT: 12.8 % (ref 12.3–15.4)
GLOBULIN UR ELPH-MCNC: 2.7 GM/DL
GLUCOSE SERPL-MCNC: 118 MG/DL (ref 70–100)
GLUCOSE UR STRIP-MCNC: NEGATIVE MG/DL
HCT VFR BLD AUTO: 42.6 % (ref 37.5–51)
HDLC SERPL-MCNC: 62 MG/DL
HGB BLD-MCNC: 14 G/DL (ref 13–17.7)
HGB UR QL STRIP.AUTO: NEGATIVE
KETONES UR QL STRIP: NEGATIVE
LDLC SERPL CALC-MCNC: 148 MG/DL (ref 0–99)
LDLC/HDLC SERPL: 2.34 {RATIO}
LEUKOCYTE ESTERASE UR QL STRIP.AUTO: NEGATIVE
LYMPHOCYTES # BLD AUTO: 1.4 10*3/MM3 (ref 0.7–3.1)
LYMPHOCYTES NFR BLD AUTO: 24.1 % (ref 19.6–45.3)
MCH RBC QN AUTO: 28.1 PG (ref 26.6–33)
MCHC RBC AUTO-ENTMCNC: 32.9 G/DL (ref 31.5–35.7)
MCV RBC AUTO: 85.5 FL (ref 79–97)
NEUTROPHILS NFR BLD AUTO: 4 10*3/MM3 (ref 1.7–7)
NEUTROPHILS NFR BLD AUTO: 66.1 % (ref 42.7–76)
NITRITE UR QL STRIP: NEGATIVE
PH UR STRIP.AUTO: 7 [PH] (ref 5–8)
PLATELET # BLD AUTO: 203 10*3/MM3 (ref 140–450)
PMV BLD AUTO: 8.6 FL (ref 6–12)
POTASSIUM SERPL-SCNC: 4.2 MMOL/L (ref 3.5–5.3)
PROT SERPL-MCNC: 7.1 G/DL (ref 6.3–8.7)
PROT UR QL STRIP: NEGATIVE
RBC # BLD AUTO: 4.98 10*6/MM3 (ref 4.14–5.8)
SODIUM SERPL-SCNC: 140 MMOL/L (ref 135–145)
SP GR UR STRIP: <=1.005 (ref 1–1.03)
TRIGL SERPL-MCNC: 115 MG/DL (ref 0–149)
UROBILINOGEN UR QL STRIP: NORMAL
VLDLC SERPL-MCNC: 20 MG/DL (ref 5–40)
WBC NRBC COR # BLD: 6 10*3/MM3 (ref 3.4–10.8)

## 2023-06-01 PROCEDURE — 80053 COMPREHEN METABOLIC PANEL: CPT

## 2023-06-01 PROCEDURE — 36415 COLL VENOUS BLD VENIPUNCTURE: CPT

## 2023-06-01 PROCEDURE — 80061 LIPID PANEL: CPT

## 2023-06-01 PROCEDURE — 81003 URINALYSIS AUTO W/O SCOPE: CPT

## 2023-06-01 PROCEDURE — 85025 COMPLETE CBC W/AUTO DIFF WBC: CPT | Performed by: PEDIATRICS

## 2023-06-01 PROCEDURE — G0103 PSA SCREENING: HCPCS

## 2023-06-01 RX ORDER — LISINOPRIL 10 MG/1
10 TABLET ORAL DAILY
Qty: 90 TABLET | Refills: 4 | Status: SHIPPED | OUTPATIENT
Start: 2023-06-01

## 2023-06-01 NOTE — PROGRESS NOTES
The ABCs of the Annual Wellness Visit  Subsequent Medicare Wellness Visit    Chief Complaint   Patient presents with    Medicare Wellness-subsequent       Subjective   History of Present Illness:  Gerard Ramirez is a 77 y.o. male who presents for a Subsequent Medicare Wellness Visit.    HEALTH RISK ASSESSMENT    Recent Hospitalizations:  No hospitalization(s) within the last year.    Current Medical Providers:  Patient Care Team:  Avinash Nayak MD as PCP - General (Pediatrics)  Valdo Mendez MD as Consulting Physician (Urology)    Smoking Status:  Social History     Tobacco Use   Smoking Status Former    Packs/day: 0.50    Years: 20.00    Pack years: 10.00    Types: Cigarettes    Quit date:     Years since quittin.4   Smokeless Tobacco Current    Types: Chew, Snuff   Tobacco Comments    Patient only smokes occassionally       Alcohol Consumption:  Social History     Substance and Sexual Activity   Alcohol Use No       Depression Screen:       2023    11:11 AM   PHQ-2/PHQ-9 Depression Screening   Little Interest or Pleasure in Doing Things 0-->not at all   Feeling Down, Depressed or Hopeless 0-->not at all   PHQ-9: Brief Depression Severity Measure Score 0       BONNY:      PHQ-2 Depression Screening  Little interest or pleasure in doing things? 0-->not at all   Feeling down, depressed, or hopeless? 0-->not at all   PHQ-2 Total Score 0      PHQ-9 Depression Screening  Little interest or pleasure in doing things? 0-->not at all   Feeling down, depressed, or hopeless? 0-->not at all   Trouble falling or staying asleep, or sleeping too much?     Feeling tired or having little energy?     Poor appetite or overeating?     Feeling bad about yourself - or that you are a failure or have let yourself or your family down?     Trouble concentrating on things, such as reading the newspaper or watching television?     Moving or speaking so slowly that other people could have noticed? Or the opposite - being so  fidgety or restless that you have been moving around a lot more than usual?     Thoughts that you would be better off dead, or of hurting yourself in some way?     PHQ-9 Total Score 0   If you checked off any problems, how difficult have these problems made it for you to do your work, take care of things at home, or get along with other people?          Fall Risk Screen:  KARINE Fall Risk Assessment was completed, and patient is at LOW risk for falls.Assessment completed on:2023    Health Habits and Functional and Cognitive Screenin/1/2023    11:09 AM   Functional & Cognitive Status   Do you have difficulty preparing food and eating? No   Do you have difficulty bathing yourself, getting dressed or grooming yourself? No   Do you have difficulty using the toilet? No   Do you have difficulty moving around from place to place? No   Do you have trouble with steps or getting out of a bed or a chair? No   Current Diet Other        Current Diet Comment vegetarian    Dental Exam Up to date   Eye Exam Up to date   Exercise (times per week) 7 times per week   Current Exercises Include Walking;House Cleaning   Do you need help using the phone?  No   Are you deaf or do you have serious difficulty hearing?  No   Do you need help with transportation? No   Do you need help shopping? No   Do you need help preparing meals?  No   Do you need help with housework?  No   Do you need help with laundry? No   Do you need help taking your medications? No   Do you need help managing money? No   Do you ever drive or ride in a car without wearing a seat belt? No   Have you felt unusual stress, anger or loneliness in the last month? No   Who do you live with? Alone   If you need help, do you have trouble finding someone available to you? No   Have you been bothered in the last four weeks by sexual problems? No   Do you have difficulty concentrating, remembering or making decisions? No     BMI is within normal parameters. No other  follow-up for BMI required.          Does the patient have evidence of cognitive impairment? No    Asprin use counseling:Does not need ASA (and currently is not on it)    Age-appropriate Screening Schedule:  Refer to the list below for future screening recommendations based on patient's age, sex and/or medical conditions. Orders for these recommended tests are listed in the plan section. The patient has been provided with a written plan.    Health Maintenance   Topic Date Due    ZOSTER VACCINE (1 of 2) Never done    Pneumococcal Vaccine 65+ (2 - PCV) 08/20/2020    COVID-19 Vaccine (3 - Booster for Moderna series) 06/01/2021    LIPID PANEL  05/02/2023    INFLUENZA VACCINE  08/01/2023    ANNUAL WELLNESS VISIT  06/01/2024    COLORECTAL CANCER SCREENING  05/09/2027    TDAP/TD VACCINES (2 - Td or Tdap) 08/20/2029    HEPATITIS C SCREENING  Completed          The following portions of the patient's history were reviewed and updated as appropriate: allergies, current medications, past family history, past medical history, past social history, past surgical history, and problem list.    Outpatient Medications Prior to Visit   Medication Sig Dispense Refill    lisinopril (PRINIVIL,ZESTRIL) 10 MG tablet Take 1 tablet by mouth Daily. 90 tablet 1     No facility-administered medications prior to visit.       Patient Active Problem List   Diagnosis    Encounter for colonoscopy due to history of adenomatous colonic polyps    Family history of colonic polyps    GERD (gastroesophageal reflux disease)    Allergic rhinitis    Essential hypertension    Hyperlipidemia    Anxiety    Acute diffuse otitis externa of left ear    BPV (benign positional vertigo), left    Medicare annual wellness visit, subsequent    Right calf pain    Screening for prostate cancer    Right leg claudication    Screening for malignant neoplasm of prostate       Advanced Care Planning:  ACP discussion was declined by the patient. Patient does not have an advance  "directive, declines further assistance.    Review of Systems    Compared to one year ago, the patient feels his physical health is the same.  Compared to one year ago, the patient feels his mental health is the same.    Reviewed chart for potential of high risk medication in the elderly: yes  Reviewed chart for potential of harmful drug interactions in the elderly:yes    Objective         Vitals:    06/01/23 1112 06/01/23 1114   BP: 168/76 138/68   Pulse: 78    SpO2: 98%    Weight: 64.9 kg (143 lb)    Height: 172.7 cm (68\")        Body mass index is 21.74 kg/m².  Discussed the patient's BMI with him. The BMI is in the acceptable range.    Physical Exam  Vitals and nursing note reviewed.   Constitutional:       Appearance: Normal appearance. He is well-developed.   HENT:      Head: Normocephalic and atraumatic.      Right Ear: Tympanic membrane, ear canal and external ear normal.      Left Ear: Tympanic membrane, ear canal and external ear normal.      Nose: Nose normal. No septal deviation, nasal tenderness or congestion.      Mouth/Throat:      Lips: Pink. No lesions.      Mouth: Mucous membranes are moist. No oral lesions.      Dentition: Normal dentition.      Pharynx: Oropharynx is clear. No pharyngeal swelling, oropharyngeal exudate or posterior oropharyngeal erythema.   Eyes:      General: Lids are normal. Vision grossly intact. No scleral icterus.        Right eye: No discharge.         Left eye: No discharge.      Extraocular Movements: Extraocular movements intact.      Conjunctiva/sclera: Conjunctivae normal.      Right eye: Right conjunctiva is not injected.      Left eye: Left conjunctiva is not injected.      Pupils: Pupils are equal, round, and reactive to light.   Neck:      Thyroid: No thyroid mass.      Trachea: Trachea normal.   Cardiovascular:      Rate and Rhythm: Normal rate and regular rhythm.      Heart sounds: Normal heart sounds. No murmur heard.    No gallop.   Pulmonary:      Effort: " Pulmonary effort is normal.      Breath sounds: Normal breath sounds and air entry. No wheezing, rhonchi or rales.   Abdominal:      General: There is no distension.      Palpations: Abdomen is soft. There is no mass.      Tenderness: There is no abdominal tenderness. There is no right CVA tenderness, left CVA tenderness, guarding or rebound.   Musculoskeletal:         General: No tenderness or deformity. Normal range of motion.      Cervical back: Full passive range of motion without pain, normal range of motion and neck supple.      Thoracic back: Normal.      Right lower leg: No edema.      Left lower leg: No edema.   Skin:     General: Skin is warm and dry.      Coloration: Skin is not jaundiced.      Findings: No rash.   Neurological:      Mental Status: He is alert and oriented to person, place, and time.      Sensory: Sensation is intact.      Motor: Motor function is intact.      Coordination: Coordination is intact.      Gait: Gait is intact.      Deep Tendon Reflexes: Reflexes are normal and symmetric.   Psychiatric:         Mood and Affect: Mood and affect normal.         Judgment: Judgment normal.             Assessment & Plan   Medicare Risks and Personalized Health Plan  CMS Preventative Services Quick Reference  Advance Directive Discussion  Dementia/Memory   Fall Risk  Immunizations Discussed/Encouraged (specific immunizations; Prevnar 20 (Pneumococcal 20-valent conjugate), Shingrix, and COVID19 )  Prostate Cancer Screening     The above risks/problems have been discussed with the patient.  Pertinent information has been shared with the patient in the After Visit Summary.  Follow up plans and orders are seen below in the Assessment/Plan Section.    Problem List Items Addressed This Visit          Cardiac and Vasculature    Essential hypertension    Relevant Medications    lisinopril (PRINIVIL,ZESTRIL) 10 MG tablet    Hyperlipidemia       Gastrointestinal Abdominal     GERD (gastroesophageal reflux  disease)       Genitourinary and Reproductive     Screening for malignant neoplasm of prostate    Relevant Orders    PSA SCREENING       Health Encounters    Medicare annual wellness visit, subsequent - Primary    Current Assessment & Plan     Discussed safety and nutrition.  Fall risk discussed.         Relevant Orders    CBC & Differential    Comprehensive metabolic panel    Lipid panel    Urinalysis With Culture If Indicated - Urine, Clean Catch        Follow Up:  Return in about 1 year (around 6/1/2024) for Annual physical.     An After Visit Summary and PPPS were given to the patient.

## 2023-06-02 LAB — PSA SERPL-MCNC: 2.46 NG/ML (ref 0–4)

## 2023-06-05 ENCOUNTER — TELEPHONE (OUTPATIENT)
Dept: FAMILY MEDICINE CLINIC | Facility: CLINIC | Age: 78
End: 2023-06-05
Payer: MEDICARE

## 2023-06-05 NOTE — TELEPHONE ENCOUNTER
----- Message from Avinash Nayak MD sent at 6/5/2023  4:14 PM CDT -----  PSA normal  Mildly elevated cholesterol. Watch family

## 2023-10-16 ENCOUNTER — TELEPHONE (OUTPATIENT)
Dept: FAMILY MEDICINE CLINIC | Facility: CLINIC | Age: 78
End: 2023-10-16
Payer: MEDICARE

## 2023-10-16 NOTE — TELEPHONE ENCOUNTER
Returned pt's call and confirmed . Confirmed that the pt can take aspirin with his lisinopril with out expecting adverse effects. Pt vu all, no questions at this time.

## 2023-10-16 NOTE — TELEPHONE ENCOUNTER
Caller: Gerard Ramirez    Relationship: Self    Best call back number: [phone]     What is the best time to reach you: SOON PLEASE     Who are you requesting to speak with (clinical staff, provider,  specific staff member): PROVIDER OR CLINICAL STAFF       What was the call regarding: PATIENT REQUESTING A CALL BACK TO DISCUSS WHETHER OR NOT HE CAN TAKE AN ASPRIN 325 MG FOR HIS HIP PAIN WITH HIS LISINIPRIL      Is it okay if the provider responds through MyChart: PREFERS A CALL BACK

## 2023-11-01 ENCOUNTER — OFFICE VISIT (OUTPATIENT)
Dept: FAMILY MEDICINE CLINIC | Facility: CLINIC | Age: 78
End: 2023-11-01
Payer: MEDICARE

## 2023-11-01 VITALS
DIASTOLIC BLOOD PRESSURE: 77 MMHG | HEART RATE: 82 BPM | OXYGEN SATURATION: 98 % | BODY MASS INDEX: 21.07 KG/M2 | WEIGHT: 139 LBS | HEIGHT: 68 IN | SYSTOLIC BLOOD PRESSURE: 138 MMHG

## 2023-11-01 DIAGNOSIS — K56.609 INTESTINAL OBSTRUCTION, UNSPECIFIED CAUSE, UNSPECIFIED WHETHER PARTIAL OR COMPLETE: Primary | ICD-10-CM

## 2023-11-01 PROCEDURE — 3075F SYST BP GE 130 - 139MM HG: CPT | Performed by: PEDIATRICS

## 2023-11-01 PROCEDURE — 99214 OFFICE O/P EST MOD 30 MIN: CPT | Performed by: PEDIATRICS

## 2023-11-01 PROCEDURE — 3078F DIAST BP <80 MM HG: CPT | Performed by: PEDIATRICS

## 2023-11-01 NOTE — PROGRESS NOTES
"Chief Complaint  Constipation and Hip Pain (Improving )    Subjective    History of Present Illness      Patient presents to [facility] for   History of Present Illness  Pt is here today with c/o constipation X4 days. He is still passing gas frequently. Some abdominal tension. No OTC used. Has moved to liquid diet over the last two (2) days.  He intially wanted to have hip pain looked at, but thsi seems to be resolving on its own. Pain started after stepping in a hole in his lawn.        Review of Systems    I have reviewed and agree with the HPI information as above.  Avinash Nayak MD     Objective   Vital Signs:   /77   Pulse 82   Ht 172.7 cm (68\")   Wt 63 kg (139 lb)   SpO2 98%   BMI 21.13 kg/m²     BMI is within normal parameters. No other follow-up for BMI required.      Physical Exam  Constitutional:       Appearance: Normal appearance. He is normal weight.   Cardiovascular:      Rate and Rhythm: Normal rate and regular rhythm.      Heart sounds: Normal heart sounds.   Pulmonary:      Effort: Pulmonary effort is normal.      Breath sounds: Normal breath sounds.   Abdominal:      General: There is distension.      Palpations: There is no mass.   Neurological:      Mental Status: He is alert.   Psychiatric:         Mood and Affect: Mood normal.         Behavior: Behavior normal.           Result Review  Data Reviewed:              Assessment and Plan      Diagnoses and all orders for this visit:    1. Intestinal obstruction, unspecified cause, unspecified whether partial or complete (Primary)  Assessment & Plan:  Sudden onset of constipation he has never had   no bowel movement 4 days    Orders:  -     CT Abdomen Pelvis With Contrast; Future            Follow Up   Return in about 1 day (around 11/2/2023) for Recheck.  Patient was given instructions and counseling regarding his condition or for health maintenance advice. Please see specific information pulled into the AVS " if appropriate.

## 2023-11-02 ENCOUNTER — HOSPITAL ENCOUNTER (OUTPATIENT)
Dept: CT IMAGING | Facility: HOSPITAL | Age: 78
Discharge: HOME | End: 2023-11-02
Admitting: PEDIATRICS
Payer: MEDICARE

## 2023-11-02 DIAGNOSIS — K56.609 INTESTINAL OBSTRUCTION, UNSPECIFIED CAUSE, UNSPECIFIED WHETHER PARTIAL OR COMPLETE: ICD-10-CM

## 2023-11-02 LAB — CREAT BLDA-MCNC: 1 MG/DL (ref 0.6–1.3)

## 2023-11-02 PROCEDURE — 82565 ASSAY OF CREATININE: CPT

## 2023-11-02 PROCEDURE — 74177 CT ABD & PELVIS W/CONTRAST: CPT

## 2023-11-02 PROCEDURE — 25510000001 IOPAMIDOL 61 % SOLUTION: Performed by: PEDIATRICS

## 2023-11-02 RX ADMIN — IOPAMIDOL 100 ML: 612 INJECTION, SOLUTION INTRAVENOUS at 10:39

## 2023-11-03 NOTE — PROGRESS NOTES
"Chief Complaint  Elevated PSA    Subjective          Gerard Ramirez presents to [facility] to follow-up elevated PSA.  Patient's had a PSA that has waxed and waned between 2 and 4.  He wishes to continue prostate cancer screening even though he is 78 years old.  Patient denies any possible systemic symptoms of prostate cancer such as weight loss, lower extremity edema, or skeletal pain that could be worrisome for systemic disease.      Current Outpatient Medications:     lisinopril (PRINIVIL,ZESTRIL) 10 MG tablet, Take 1 tablet by mouth Daily., Disp: 90 tablet, Rfl: 4  Past Medical History:   Diagnosis Date    Anxiety     Decreased libido     Elevated PSA     Erectile dysfunction     GERD (gastroesophageal reflux disease)     Sinus problem      Past Surgical History:   Procedure Laterality Date    CIRCUMCISION      FOOT SURGERY Left     TONSILLECTOMY             Review of Systems      Objective   PHYSICAL EXAM  Vital Signs:   Temp 96.6 °F (35.9 °C)   Ht 172.7 cm (68\")   Wt 66 kg (145 lb 6.4 oz)   BMI 22.11 kg/m²     Physical Exam      DATA  Result Review :              Results for orders placed or performed in visit on 11/14/23   POC Urinalysis Dipstick, Multipro    Specimen: Urine   Result Value Ref Range    Color Yellow Yellow, Straw, Dark Yellow, Ava    Clarity, UA Clear Clear    Glucose, UA Negative Negative mg/dL    Bilirubin Negative Negative    Ketones, UA Negative Negative    Specific Gravity  1.015 1.005 - 1.030    Blood, UA Trace (A) Negative    pH, Urine 7.0 5.0 - 8.0    Protein, POC Negative Negative mg/dL    Urobilinogen, UA 0.2 E.U./dL Normal, 0.2 E.U./dL    Nitrite, UA Negative Negative    Leukocytes Negative Negative       Lab Results   Component Value Date    PSA 1.990 11/07/2023    PSA 2.460 06/01/2023    PSA 2.220 11/03/2022         CT Abdomen Pelvis With Contrast (11/02/2023 10:38)  Reviewed report and the images/DLS         ASSESSMENT AND PLAN          Problem List " Items Addressed This Visit    None  Visit Diagnoses       Elevated PSA    -  Primary    Relevant Orders    POC Urinalysis Dipstick, Multipro (Completed)        His PSA has normalized on the last two I have checked.           FOLLOW UP     Return if symptoms worsen or fail to improve.        (Please note that portions of this note were completed with a voice recognition program.)  Valdo Mendez MD  11/14/23  09:47 CST

## 2023-11-07 ENCOUNTER — LAB (OUTPATIENT)
Dept: LAB | Facility: HOSPITAL | Age: 78
End: 2023-11-07
Payer: MEDICARE

## 2023-11-07 ENCOUNTER — TELEPHONE (OUTPATIENT)
Dept: FAMILY MEDICINE CLINIC | Facility: CLINIC | Age: 78
End: 2023-11-07
Payer: MEDICARE

## 2023-11-07 DIAGNOSIS — R97.20 ELEVATED PSA: ICD-10-CM

## 2023-11-07 DIAGNOSIS — R97.20 ELEVATED PSA: Primary | ICD-10-CM

## 2023-11-07 LAB — PSA SERPL-MCNC: 1.99 NG/ML (ref 0–4)

## 2023-11-07 PROCEDURE — 36415 COLL VENOUS BLD VENIPUNCTURE: CPT

## 2023-11-07 PROCEDURE — 84153 ASSAY OF PSA TOTAL: CPT

## 2023-11-07 NOTE — TELEPHONE ENCOUNTER
Called patient and confirmed date of birth. Relayed results/recommendations. Pt verbally understood all, no questions at this time. Pt states he had a BM and is feeling much better.

## 2023-11-07 NOTE — TELEPHONE ENCOUNTER
----- Message from Avinash Nayak MD sent at 11/6/2023  3:57 PM CST -----     IMPRESSION:  1. No acute abnormality of the abdomen or pelvis, particularly, no  finding to suggest bowel obstruction.  2. Large volume stool in the colon may suggest constipation. No finding  to suggest obstruction.  3. Appendix is not visualized. No finding to suggest appendicitis. No  gallstones.

## 2023-11-14 ENCOUNTER — OFFICE VISIT (OUTPATIENT)
Dept: UROLOGY | Facility: CLINIC | Age: 78
End: 2023-11-14
Payer: MEDICARE

## 2023-11-14 VITALS — TEMPERATURE: 96.6 F | BODY MASS INDEX: 22.04 KG/M2 | WEIGHT: 145.4 LBS | HEIGHT: 68 IN

## 2023-11-14 DIAGNOSIS — R97.20 ELEVATED PSA: Primary | ICD-10-CM

## 2023-11-14 LAB
BILIRUB BLD-MCNC: NEGATIVE MG/DL
CLARITY, POC: CLEAR
COLOR UR: YELLOW
GLUCOSE UR STRIP-MCNC: NEGATIVE MG/DL
KETONES UR QL: NEGATIVE
LEUKOCYTE EST, POC: NEGATIVE
NITRITE UR-MCNC: NEGATIVE MG/ML
PH UR: 7 [PH] (ref 5–8)
PROT UR STRIP-MCNC: NEGATIVE MG/DL
RBC # UR STRIP: ABNORMAL /UL
SP GR UR: 1.01 (ref 1–1.03)
UROBILINOGEN UR QL: ABNORMAL

## 2023-11-14 PROCEDURE — 81003 URINALYSIS AUTO W/O SCOPE: CPT | Performed by: UROLOGY

## 2023-11-14 PROCEDURE — 1160F RVW MEDS BY RX/DR IN RCRD: CPT | Performed by: UROLOGY

## 2023-11-14 PROCEDURE — 1159F MED LIST DOCD IN RCRD: CPT | Performed by: UROLOGY

## 2023-11-14 PROCEDURE — 99213 OFFICE O/P EST LOW 20 MIN: CPT | Performed by: UROLOGY

## 2024-03-05 ENCOUNTER — OFFICE VISIT (OUTPATIENT)
Dept: FAMILY MEDICINE CLINIC | Facility: CLINIC | Age: 79
End: 2024-03-05
Payer: MEDICARE

## 2024-03-05 VITALS
SYSTOLIC BLOOD PRESSURE: 142 MMHG | OXYGEN SATURATION: 98 % | RESPIRATION RATE: 20 BRPM | HEART RATE: 88 BPM | HEIGHT: 68 IN | WEIGHT: 145 LBS | DIASTOLIC BLOOD PRESSURE: 72 MMHG | TEMPERATURE: 97.8 F | BODY MASS INDEX: 21.98 KG/M2

## 2024-03-05 DIAGNOSIS — S51.012A SKIN TEAR OF LEFT ELBOW WITHOUT COMPLICATION, INITIAL ENCOUNTER: Primary | ICD-10-CM

## 2024-03-05 RX ORDER — AMOXICILLIN 500 MG/1
1000 CAPSULE ORAL 2 TIMES DAILY
Qty: 20 CAPSULE | Refills: 0 | Status: SHIPPED | OUTPATIENT
Start: 2024-03-05 | End: 2024-03-10

## 2024-03-05 NOTE — PROGRESS NOTES
"       Chief Complaint  cut on arm    Subjective        Gerard Ramirez presents to [facility]    HPI    Left arm injury  -Yesterday was turning a corner around door in his house and bumped his left elbow into a door frame.  He felt some discomfort sometime afterwards and checked his arm and noticed that it was bleeding in the skin broken.  He has had no continued bleeding.  Pain and swelling have gotten slightly better.  He is not on blood thinners    Past Surgical History:   Procedure Laterality Date    CIRCUMCISION      FOOT SURGERY Left     TONSILLECTOMY       Social History     Socioeconomic History    Marital status: Single   Tobacco Use    Smoking status: Former     Current packs/day: 0.00     Average packs/day: 0.5 packs/day for 20.0 years (10.0 ttl pk-yrs)     Types: Cigarettes     Start date:      Quit date:      Years since quittin.2    Smokeless tobacco: Current     Types: Chew, Snuff    Tobacco comments:     Patient only smokes occassionally   Vaping Use    Vaping status: Never Used   Substance and Sexual Activity    Alcohol use: No    Drug use: No    Sexual activity: Never       Objective   Vital Signs:  /72   Pulse 88   Temp 97.8 °F (36.6 °C) (Temporal)   Resp 20   Ht 172.7 cm (67.99\")   Wt 65.8 kg (145 lb)   SpO2 98%   BMI 22.05 kg/m²   Estimated body mass index is 22.05 kg/m² as calculated from the following:    Height as of this encounter: 172.7 cm (67.99\").    Weight as of this encounter: 65.8 kg (145 lb).       BMI is within normal parameters. No other follow-up for BMI required.      Physical Exam  Vitals reviewed.   Constitutional:       Appearance: Normal appearance.   HENT:      Head: Normocephalic.      Nose: Nose normal.      Mouth/Throat:      Mouth: Mucous membranes are moist.   Eyes:      Extraocular Movements: Extraocular movements intact.   Cardiovascular:      Rate and Rhythm: Normal rate and regular rhythm.      Heart sounds: " Normal heart sounds.   Pulmonary:      Effort: Pulmonary effort is normal.      Breath sounds: Normal breath sounds.   Musculoskeletal:         General: Normal range of motion.      Cervical back: Normal range of motion.   Skin:     General: Skin is warm and dry.      Comments: Left elbow/proximal forearm lateral aspect: Approximately 3 and half centimeter long linear superficial abrasion exposing upper dermis.  There is dried blood and surrounding ecchymosis to skin.  No significant swelling.  No surrounding erythema, streaking, discharge.  Surrounding skin appears thin in nature   Neurological:      General: No focal deficit present.      Mental Status: He is alert and oriented to person, place, and time.   Psychiatric:         Mood and Affect: Mood normal.         Behavior: Behavior normal.        Result Review :                   Assessment and Plan   Diagnoses and all orders for this visit:    1. Skin tear of left elbow without complication, initial encounter (Primary)  -Wound starting to heal already.  Skin too thin to attempt reapproximation with sutures, could likely tear skin.  Will allow it to heal naturally.  Dressed wound with oil emulsion gauze covered w/ supportive gauze and bandage, instructed to apply new dressing daily, follow-up in 1 week.  Will cover with antibiotic to reduce likelihood of infection.  Patient notes several allergies to different antibiotics. RTC parameters given.  -     amoxicillin (AMOXIL) 500 MG capsule; Take 2 capsules by mouth 2 (Two) Times a Day for 5 days.  Dispense: 20 capsule; Refill: 0           I spent 30 minutes caring for Gerard on this date of service. This time includes time spent by me in the following activities:preparing for the visit, performing a medically appropriate examination and/or evaluation , counseling and educating the patient/family/caregiver, ordering medications, tests, or procedures, documenting information in the medical record, independently  interpreting results and communicating that information with the patient/family/caregiver, and care coordination  EMR Dragon/Transcription disclaimer:   Much of this encounter note is an electronic transcription/translation of spoken language to printed text. The electronic translation of spoken language may permit erroneous, or at times, nonsensical words or phrases to be inadvertently transcribed; although attempts have made to review the note for such errors, some may still exist. Please excuse any unrecognized transcription errors and contact us if the air is unintelligible or needs documented correction. Also, portions of this note have been copied forward, however, changed to reflect the most current clinical status of this patient.  Follow Up   No follow-ups on file.  Patient was given instructions and counseling regarding his condition or for health maintenance advice. Please see specific information pulled into the AVS if appropriate.

## 2024-03-12 ENCOUNTER — OFFICE VISIT (OUTPATIENT)
Dept: FAMILY MEDICINE CLINIC | Facility: CLINIC | Age: 79
End: 2024-03-12
Payer: MEDICARE

## 2024-03-12 VITALS
SYSTOLIC BLOOD PRESSURE: 160 MMHG | DIASTOLIC BLOOD PRESSURE: 78 MMHG | HEIGHT: 68 IN | BODY MASS INDEX: 21.98 KG/M2 | RESPIRATION RATE: 20 BRPM | TEMPERATURE: 97.5 F | WEIGHT: 145 LBS | OXYGEN SATURATION: 98 % | HEART RATE: 75 BPM

## 2024-03-12 DIAGNOSIS — S51.012A SKIN TEAR OF LEFT ELBOW WITHOUT COMPLICATION, INITIAL ENCOUNTER: Primary | ICD-10-CM

## 2024-03-12 NOTE — PROGRESS NOTES
"       Chief Complaint  skin tear    Subjective        Gerard Ramirez presents to [facility]    HPI    FU  -Here to fu on superficial skin tear of L arm from 1 week ago. Pt reports this is healing well, no concerns. He has been keeping wound cleaned and dressed daily. No other symptoms/concerns.    Past Medical History:   Diagnosis Date    Anxiety     Decreased libido     Elevated PSA     Erectile dysfunction     GERD (gastroesophageal reflux disease)     Sinus problem      Past Surgical History:   Procedure Laterality Date    CIRCUMCISION      FOOT SURGERY Left     TONSILLECTOMY       Social History     Socioeconomic History    Marital status: Single   Tobacco Use    Smoking status: Former     Current packs/day: 0.00     Average packs/day: 0.5 packs/day for 20.0 years (10.0 ttl pk-yrs)     Types: Cigarettes     Start date:      Quit date:      Years since quittin.2    Smokeless tobacco: Current     Types: Chew, Snuff    Tobacco comments:     Patient only smokes occassionally   Vaping Use    Vaping status: Never Used   Substance and Sexual Activity    Alcohol use: No    Drug use: No    Sexual activity: Never       Objective   Vital Signs:  /78   Pulse 75   Temp 97.5 °F (36.4 °C) (Temporal)   Resp 20   Ht 172.7 cm (67.99\")   Wt 65.8 kg (145 lb)   SpO2 98%   BMI 22.05 kg/m²   Estimated body mass index is 22.05 kg/m² as calculated from the following:    Height as of this encounter: 172.7 cm (67.99\").    Weight as of this encounter: 65.8 kg (145 lb).       BMI is within normal parameters. No other follow-up for BMI required.      Physical Exam  Vitals reviewed.   Constitutional:       Appearance: Normal appearance.   HENT:      Head: Normocephalic.      Nose: Nose normal.      Mouth/Throat:      Mouth: Mucous membranes are moist.   Eyes:      Extraocular Movements: Extraocular movements intact.   Cardiovascular:      Rate and Rhythm: Normal rate and regular " rhythm.      Heart sounds: Normal heart sounds.   Pulmonary:      Effort: Pulmonary effort is normal.      Breath sounds: Normal breath sounds.   Musculoskeletal:         General: Normal range of motion.      Cervical back: Normal range of motion.   Skin:     General: Skin is warm and dry.      Comments: L arm superficial abrasion in middle of healing stage. No spreading erythema/drainage.   Neurological:      General: No focal deficit present.      Mental Status: He is alert and oriented to person, place, and time.   Psychiatric:         Mood and Affect: Mood normal.         Behavior: Behavior normal.        Result Review :                   Assessment and Plan   Diagnoses and all orders for this visit:    1. Skin tear of left elbow without complication, initial encounter (Primary)  -Healing appropriately, will monitor.  Discussed continued wound care until completely healed.  RTC parameters given           EMR Dragon/Transcription disclaimer:   Much of this encounter note is an electronic transcription/translation of spoken language to printed text. The electronic translation of spoken language may permit erroneous, or at times, nonsensical words or phrases to be inadvertently transcribed; although attempts have made to review the note for such errors, some may still exist. Please excuse any unrecognized transcription errors and contact us if the air is unintelligible or needs documented correction. Also, portions of this note have been copied forward, however, changed to reflect the most current clinical status of this patient.  Follow Up   No follow-ups on file.  Patient was given instructions and counseling regarding his condition or for health maintenance advice. Please see specific information pulled into the AVS if appropriate.

## 2024-06-11 ENCOUNTER — LAB (OUTPATIENT)
Dept: LAB | Facility: HOSPITAL | Age: 79
End: 2024-06-11
Payer: MEDICARE

## 2024-06-11 ENCOUNTER — OFFICE VISIT (OUTPATIENT)
Dept: FAMILY MEDICINE CLINIC | Facility: CLINIC | Age: 79
End: 2024-06-11
Payer: MEDICARE

## 2024-06-11 ENCOUNTER — TELEPHONE (OUTPATIENT)
Dept: FAMILY MEDICINE CLINIC | Facility: CLINIC | Age: 79
End: 2024-06-11
Payer: MEDICARE

## 2024-06-11 VITALS
SYSTOLIC BLOOD PRESSURE: 174 MMHG | HEIGHT: 68 IN | HEART RATE: 84 BPM | DIASTOLIC BLOOD PRESSURE: 88 MMHG | OXYGEN SATURATION: 97 % | BODY MASS INDEX: 21.98 KG/M2 | WEIGHT: 145 LBS

## 2024-06-11 DIAGNOSIS — Z00.00 MEDICARE ANNUAL WELLNESS VISIT, SUBSEQUENT: ICD-10-CM

## 2024-06-11 DIAGNOSIS — Z00.00 MEDICARE ANNUAL WELLNESS VISIT, SUBSEQUENT: Primary | ICD-10-CM

## 2024-06-11 DIAGNOSIS — I10 ESSENTIAL HYPERTENSION: ICD-10-CM

## 2024-06-11 LAB
ALBUMIN SERPL-MCNC: 4.2 G/DL (ref 3.5–5)
ALBUMIN/GLOB SERPL: 1.5 G/DL (ref 1.1–2.5)
ALP SERPL-CCNC: 70 U/L (ref 24–120)
ALT SERPL W P-5'-P-CCNC: 17 U/L (ref 0–50)
ANION GAP SERPL CALCULATED.3IONS-SCNC: 13 MMOL/L (ref 4–13)
AST SERPL-CCNC: 22 U/L (ref 7–45)
AUTO MIXED CELLS #: 0.5 10*3/MM3 (ref 0.1–2.6)
AUTO MIXED CELLS %: 8.4 % (ref 0.1–24)
BILIRUB SERPL-MCNC: 0.6 MG/DL (ref 0.1–1)
BILIRUB UR QL STRIP: NEGATIVE
BUN SERPL-MCNC: 11 MG/DL (ref 5–21)
BUN/CREAT SERPL: 13.1
CALCIUM SPEC-SCNC: 10.4 MG/DL (ref 8.6–10.5)
CHLORIDE SERPL-SCNC: 99 MMOL/L (ref 98–110)
CHOLEST SERPL-MCNC: 198 MG/DL (ref 130–200)
CLARITY UR: CLEAR
CO2 SERPL-SCNC: 27 MMOL/L (ref 24–31)
COLOR UR: YELLOW
CREAT SERPL-MCNC: 0.84 MG/DL (ref 0.5–1.4)
EGFRCR SERPLBLD CKD-EPI 2021: 89.3 ML/MIN/1.73
ERYTHROCYTE [DISTWIDTH] IN BLOOD BY AUTOMATED COUNT: 13.7 % (ref 12.3–15.4)
GLOBULIN UR ELPH-MCNC: 2.8 GM/DL
GLUCOSE SERPL-MCNC: 94 MG/DL (ref 70–100)
GLUCOSE UR STRIP-MCNC: NEGATIVE MG/DL
HCT VFR BLD AUTO: 43.3 % (ref 37.5–51)
HDLC SERPL-MCNC: 55 MG/DL
HGB BLD-MCNC: 14.1 G/DL (ref 13–17.7)
HGB UR QL STRIP.AUTO: NEGATIVE
KETONES UR QL STRIP: NEGATIVE
LDLC SERPL CALC-MCNC: 120 MG/DL (ref 0–99)
LDLC/HDLC SERPL: 2.13 {RATIO}
LEUKOCYTE ESTERASE UR QL STRIP.AUTO: NEGATIVE
LYMPHOCYTES # BLD AUTO: 1.1 10*3/MM3 (ref 0.7–3.1)
LYMPHOCYTES NFR BLD AUTO: 19.8 % (ref 19.6–45.3)
MCH RBC QN AUTO: 28.4 PG (ref 26.6–33)
MCHC RBC AUTO-ENTMCNC: 32.6 G/DL (ref 31.5–35.7)
MCV RBC AUTO: 87.1 FL (ref 79–97)
NEUTROPHILS NFR BLD AUTO: 4.2 10*3/MM3 (ref 1.7–7)
NEUTROPHILS NFR BLD AUTO: 71.8 % (ref 42.7–76)
NITRITE UR QL STRIP: NEGATIVE
PH UR STRIP.AUTO: 6 [PH] (ref 5–8)
PLATELET # BLD AUTO: 185 10*3/MM3 (ref 140–450)
PMV BLD AUTO: 9 FL (ref 6–12)
POTASSIUM SERPL-SCNC: 4 MMOL/L (ref 3.5–5.3)
PROT SERPL-MCNC: 7 G/DL (ref 6.3–8.7)
PROT UR QL STRIP: NEGATIVE
RBC # BLD AUTO: 4.97 10*6/MM3 (ref 4.14–5.8)
SODIUM SERPL-SCNC: 139 MMOL/L (ref 135–145)
SP GR UR STRIP: <=1.005 (ref 1–1.03)
TRIGL SERPL-MCNC: 130 MG/DL (ref 0–149)
UROBILINOGEN UR QL STRIP: NORMAL
VLDLC SERPL-MCNC: 23 MG/DL (ref 5–40)
WBC NRBC COR # BLD AUTO: 5.8 10*3/MM3 (ref 3.4–10.8)

## 2024-06-11 PROCEDURE — 1126F AMNT PAIN NOTED NONE PRSNT: CPT | Performed by: PEDIATRICS

## 2024-06-11 PROCEDURE — 80053 COMPREHEN METABOLIC PANEL: CPT

## 2024-06-11 PROCEDURE — 36415 COLL VENOUS BLD VENIPUNCTURE: CPT

## 2024-06-11 PROCEDURE — 1160F RVW MEDS BY RX/DR IN RCRD: CPT | Performed by: PEDIATRICS

## 2024-06-11 PROCEDURE — 81003 URINALYSIS AUTO W/O SCOPE: CPT

## 2024-06-11 PROCEDURE — G0439 PPPS, SUBSEQ VISIT: HCPCS | Performed by: PEDIATRICS

## 2024-06-11 PROCEDURE — 3077F SYST BP >= 140 MM HG: CPT | Performed by: PEDIATRICS

## 2024-06-11 PROCEDURE — 1170F FXNL STATUS ASSESSED: CPT | Performed by: PEDIATRICS

## 2024-06-11 PROCEDURE — 3079F DIAST BP 80-89 MM HG: CPT | Performed by: PEDIATRICS

## 2024-06-11 PROCEDURE — 85025 COMPLETE CBC W/AUTO DIFF WBC: CPT

## 2024-06-11 PROCEDURE — 1159F MED LIST DOCD IN RCRD: CPT | Performed by: PEDIATRICS

## 2024-06-11 PROCEDURE — 80061 LIPID PANEL: CPT

## 2024-06-11 RX ORDER — LISINOPRIL 10 MG/1
10 TABLET ORAL DAILY
Qty: 90 TABLET | Refills: 3 | Status: SHIPPED | OUTPATIENT
Start: 2024-06-11

## 2024-06-11 NOTE — PROGRESS NOTES
The ABCs of the Annual Wellness Visit  Subsequent Medicare Wellness Visit    Subjective    Gerard Ramirez is a 78 y.o. male who presents for a Subsequent Medicare Wellness Visit.    The following portions of the patient's history were reviewed and   updated as appropriate: allergies, current medications, past family history, past medical history, past social history, past surgical history, and problem list.    Compared to one year ago, the patient feels his physical   health is better.    Compared to one year ago, the patient feels his mental   health is the same.    Recent Hospitalizations:  He was not admitted to the hospital during the last year.       Current Medical Providers:  Patient Care Team:  Flako Hinojosa MD as PCP - General (Family Medicine)  Valdo Mendez MD as Consulting Physician (Urology)    Outpatient Medications Prior to Visit   Medication Sig Dispense Refill    lisinopril (PRINIVIL,ZESTRIL) 10 MG tablet Take 1 tablet by mouth Daily. 90 tablet 4     No facility-administered medications prior to visit.       No opioid medication identified on active medication list. I have reviewed chart for other potential  high risk medication/s and harmful drug interactions in the elderly.        Aspirin is not on active medication list.  Aspirin use is not indicated based on review of current medical condition/s. Risk of harm outweighs potential benefits.  .    Patient Active Problem List   Diagnosis    Encounter for colonoscopy due to history of adenomatous colonic polyps    Family history of colonic polyps    GERD (gastroesophageal reflux disease)    Allergic rhinitis    Essential hypertension    Hyperlipidemia    Anxiety    Acute diffuse otitis externa of left ear    BPV (benign positional vertigo), left    Medicare annual wellness visit, subsequent    Right calf pain    Screening for prostate cancer    Right leg claudication    Screening for malignant neoplasm of prostate    Intestinal obstruction  "    Advance Care Planning   Advance Care Planning     Advance Directive is not on file.  ACP discussion was held with the patient during this visit. Patient does not have an advance directive, declines further assistance.     Objective    Vitals:    24 0954 24 0956   BP: (!) 185/86 174/88   Pulse: 84    SpO2: 97%    Weight: 65.8 kg (145 lb)    Height: 172.7 cm (68\")      Estimated body mass index is 22.05 kg/m² as calculated from the following:    Height as of this encounter: 172.7 cm (68\").    Weight as of this encounter: 65.8 kg (145 lb).    BMI is within normal parameters. No other follow-up for BMI required.      Does the patient have evidence of cognitive impairment? No          HEALTH RISK ASSESSMENT    Smoking Status:  Social History     Tobacco Use   Smoking Status Former    Current packs/day: 0.00    Average packs/day: 0.5 packs/day for 20.0 years (10.0 ttl pk-yrs)    Types: Cigarettes    Start date:     Quit date:     Years since quittin.4   Smokeless Tobacco Current    Types: Chew, Snuff   Tobacco Comments    Patient only smokes occassionally     Alcohol Consumption:  Social History     Substance and Sexual Activity   Alcohol Use No     Fall Risk Screen:    KARINE Fall Risk Assessment was completed, and patient is at LOW risk for falls.Assessment completed on:2024    Depression Screenin/11/2024     9:50 AM   PHQ-2/PHQ-9 Depression Screening   Little Interest or Pleasure in Doing Things 0-->not at all   Feeling Down, Depressed or Hopeless 0-->not at all   PHQ-9: Brief Depression Severity Measure Score 0       Health Habits and Functional and Cognitive Screenin/11/2024     9:50 AM   Functional & Cognitive Status   Do you have difficulty preparing food and eating? No   Do you have difficulty bathing yourself, getting dressed or grooming yourself? No   Do you have difficulty using the toilet? No   Do you have difficulty moving around from place to place? No   Do " you have trouble with steps or getting out of a bed or a chair? No   Current Diet Well Balanced Diet   Dental Exam Up to date   Eye Exam Up to date   Exercise (times per week) 7 times per week   Current Exercises Include Walking   Do you need help using the phone?  No   Are you deaf or do you have serious difficulty hearing?  No   Do you need help to go to places out of walking distance? No   Do you need help shopping? No   Do you need help preparing meals?  No   Do you need help with housework?  No   Do you need help with laundry? No   Do you need help taking your medications? No   Do you need help managing money? No   Do you ever drive or ride in a car without wearing a seat belt? No   Have you felt unusual stress, anger or loneliness in the last month? No   Who do you live with? Alone   If you need help, do you have trouble finding someone available to you? No   Have you been bothered in the last four weeks by sexual problems? No   Do you have difficulty concentrating, remembering or making decisions? No       Age-appropriate Screening Schedule:  Refer to the list below for future screening recommendations based on patient's age, sex and/or medical conditions. Orders for these recommended tests are listed in the plan section. The patient has been provided with a written plan.    Health Maintenance   Topic Date Due    ZOSTER VACCINE (1 of 2) Never done    COVID-19 Vaccine (3 - 2023-24 season) 09/01/2023    LIPID PANEL  06/01/2024    RSV Vaccine - Adults (1 - 1-dose 60+ series) 03/05/2025 (Originally 10/5/2005)    Pneumococcal Vaccine 65+ (2 of 2 - PCV) 03/05/2025 (Originally 8/20/2020)    INFLUENZA VACCINE  08/01/2024    ANNUAL WELLNESS VISIT  06/11/2025    COLORECTAL CANCER SCREENING  05/09/2027    TDAP/TD VACCINES (2 - Td or Tdap) 08/20/2029    HEPATITIS C SCREENING  Completed                  CMS Preventative Services Quick Reference  Risk Factors Identified During Encounter  Immunizations Discussed/Encouraged:  "Shingrix and COVID19  The above risks/problems have been discussed with the patient.  Pertinent information has been shared with the patient in the After Visit Summary.  An After Visit Summary and PPPS were made available to the patient.    Follow Up:   Next Medicare Wellness visit to be scheduled in 1 year.       Additional E&M Note during same encounter follows:  Patient has multiple medical problems which are significant and separately identifiable that require additional work above and beyond the Medicare Wellness Visit.      Chief Complaint  Medicare Wellness-subsequent    Subjective        HPI  Gerard Ramirez has no other complaints or concerns at this time.          Objective   Vital Signs:  /88   Pulse 84   Ht 172.7 cm (68\")   Wt 65.8 kg (145 lb)   SpO2 97%   BMI 22.05 kg/m²     Physical Exam  Vitals and nursing note reviewed.   Constitutional:       Appearance: Normal appearance. He is well-developed.   HENT:      Head: Normocephalic and atraumatic.      Right Ear: Tympanic membrane, ear canal and external ear normal.      Left Ear: Tympanic membrane, ear canal and external ear normal.      Nose: Nose normal. No septal deviation, nasal tenderness or congestion.      Mouth/Throat:      Lips: Pink. No lesions.      Mouth: Mucous membranes are moist. No oral lesions.      Dentition: Normal dentition.      Pharynx: Oropharynx is clear. No pharyngeal swelling, oropharyngeal exudate or posterior oropharyngeal erythema.   Eyes:      General: Lids are normal. Vision grossly intact. No scleral icterus.        Right eye: No discharge.         Left eye: No discharge.      Extraocular Movements: Extraocular movements intact.      Conjunctiva/sclera: Conjunctivae normal.      Right eye: Right conjunctiva is not injected.      Left eye: Left conjunctiva is not injected.      Pupils: Pupils are equal, round, and reactive to light.   Neck:      Thyroid: No thyroid mass.      Trachea: Trachea normal. "   Cardiovascular:      Rate and Rhythm: Normal rate and regular rhythm.      Heart sounds: Normal heart sounds. No murmur heard.     No gallop.   Pulmonary:      Effort: Pulmonary effort is normal.      Breath sounds: Normal breath sounds and air entry. No wheezing, rhonchi or rales.   Abdominal:      General: There is no distension.      Palpations: Abdomen is soft. There is no mass.      Tenderness: There is no abdominal tenderness. There is no right CVA tenderness, left CVA tenderness, guarding or rebound.   Musculoskeletal:         General: No tenderness or deformity. Normal range of motion.      Cervical back: Full passive range of motion without pain, normal range of motion and neck supple.      Thoracic back: Normal.      Right lower leg: No edema.      Left lower leg: No edema.   Skin:     General: Skin is warm and dry.      Coloration: Skin is not jaundiced.      Findings: No rash.   Neurological:      Mental Status: He is alert and oriented to person, place, and time.      Sensory: Sensation is intact.      Motor: Motor function is intact.      Coordination: Coordination is intact.      Gait: Gait is intact.      Deep Tendon Reflexes: Reflexes are normal and symmetric.   Psychiatric:         Mood and Affect: Mood and affect normal.         Judgment: Judgment normal.                         Assessment and Plan   Diagnoses and all orders for this visit:    1. Medicare annual wellness visit, subsequent (Primary)  Assessment & Plan:  Decreased safety, balance and nutrition.      Orders:  -     CBC w AUTO Differential; Future  -     Comprehensive metabolic panel; Future  -     Lipid panel; Future  -     Urinalysis With Culture If Indicated - Urine, Clean Catch; Future    2. Essential hypertension  Assessment & Plan:  BP elevated in the office today.  He reports normal home readings.      Orders:  -     lisinopril (PRINIVIL,ZESTRIL) 10 MG tablet; Take 1 tablet by mouth Daily.  Dispense: 90 tablet; Refill:  3             Follow Up   Return in about 1 year (around 6/11/2025) for Medicare Wellness.  Patient was given instructions and counseling regarding his condition or for health maintenance advice. Please see specific information pulled into the AVS if appropriate.

## 2024-10-30 ENCOUNTER — CLINICAL SUPPORT (OUTPATIENT)
Dept: FAMILY MEDICINE CLINIC | Facility: CLINIC | Age: 79
End: 2024-10-30
Payer: MEDICARE

## 2024-10-30 VITALS — WEIGHT: 144 LBS | HEIGHT: 68 IN | BODY MASS INDEX: 21.82 KG/M2

## 2024-10-30 DIAGNOSIS — Z23 FLU VACCINE NEED: Primary | ICD-10-CM

## 2024-10-30 PROCEDURE — 90662 IIV NO PRSV INCREASED AG IM: CPT | Performed by: PEDIATRICS

## 2024-10-30 PROCEDURE — G0008 ADMIN INFLUENZA VIRUS VAC: HCPCS | Performed by: PEDIATRICS

## 2024-12-17 ENCOUNTER — OFFICE VISIT (OUTPATIENT)
Dept: FAMILY MEDICINE CLINIC | Facility: CLINIC | Age: 79
End: 2024-12-17
Payer: MEDICARE

## 2024-12-17 VITALS
HEART RATE: 90 BPM | OXYGEN SATURATION: 99 % | SYSTOLIC BLOOD PRESSURE: 168 MMHG | BODY MASS INDEX: 22.13 KG/M2 | WEIGHT: 146 LBS | TEMPERATURE: 97.8 F | DIASTOLIC BLOOD PRESSURE: 84 MMHG | RESPIRATION RATE: 20 BRPM | HEIGHT: 68 IN

## 2024-12-17 DIAGNOSIS — R42 VERTIGO: Primary | ICD-10-CM

## 2024-12-17 DIAGNOSIS — H66.42: ICD-10-CM

## 2024-12-17 PROCEDURE — 99213 OFFICE O/P EST LOW 20 MIN: CPT | Performed by: PEDIATRICS

## 2024-12-17 PROCEDURE — 3077F SYST BP >= 140 MM HG: CPT | Performed by: PEDIATRICS

## 2024-12-17 PROCEDURE — 96372 THER/PROPH/DIAG INJ SC/IM: CPT | Performed by: PEDIATRICS

## 2024-12-17 PROCEDURE — 1126F AMNT PAIN NOTED NONE PRSNT: CPT | Performed by: PEDIATRICS

## 2024-12-17 PROCEDURE — 3079F DIAST BP 80-89 MM HG: CPT | Performed by: PEDIATRICS

## 2024-12-17 RX ORDER — CEFUROXIME AXETIL 500 MG/1
500 TABLET ORAL 2 TIMES DAILY
Qty: 20 TABLET | Refills: 0 | Status: SHIPPED | OUTPATIENT
Start: 2024-12-17

## 2024-12-17 RX ORDER — DEXAMETHASONE SODIUM PHOSPHATE 4 MG/ML
8 INJECTION, SOLUTION INTRA-ARTICULAR; INTRALESIONAL; INTRAMUSCULAR; INTRAVENOUS; SOFT TISSUE ONCE
Status: COMPLETED | OUTPATIENT
Start: 2024-12-17 | End: 2024-12-17

## 2024-12-17 RX ADMIN — DEXAMETHASONE SODIUM PHOSPHATE 8 MG: 4 INJECTION, SOLUTION INTRA-ARTICULAR; INTRALESIONAL; INTRAMUSCULAR; INTRAVENOUS; SOFT TISSUE at 16:12

## 2024-12-17 NOTE — ASSESSMENT & PLAN NOTE
Trouble walking due to vertigo   tm either om vs cholesteatoma    will treat and see in 2 weeks get ct mastoid /tm if no better and ent referral

## 2024-12-17 NOTE — PROGRESS NOTES
"Chief Complaint  Dizziness and left ear fullness    Subjective    History of Present Illness      Patient presents to [facility] for   History of Present Illness  Pt is here today c/o vertigo and \"swimmer's ear in his left ear\" which started last week..       Review of Systems    I have reviewed and agree with the HPI information as above.  Avinash Nayak MD     Objective   Vital Signs:   /84   Pulse 90   Temp 97.8 °F (36.6 °C) (Temporal)   Resp 20   Ht 172.7 cm (68\")   Wt 66.2 kg (146 lb)   SpO2 99%   BMI 22.20 kg/m²     BMI is within normal parameters. No other follow-up for BMI required.      Physical Exam  Constitutional:       Appearance: Normal appearance. He is normal weight.   HENT:      Right Ear: Tympanic membrane normal.      Ears:        Comments: Yellow collection behind eardrum   ? Inf vs cholestetoma  Cardiovascular:      Rate and Rhythm: Normal rate and regular rhythm.      Heart sounds: Normal heart sounds.   Pulmonary:      Effort: Pulmonary effort is normal.      Breath sounds: Normal breath sounds.   Neurological:      Mental Status: He is alert.   Psychiatric:         Mood and Affect: Mood normal.         Behavior: Behavior normal.          BONNY-7:      PHQ-2 Depression Screening    Little interest or pleasure in doing things?     Feeling down, depressed, or hopeless?     PHQ-2 Total Score        PHQ-9 Depression Screening  Little interest or pleasure in doing things?     Feeling down, depressed, or hopeless?     PHQ-2 Total Score     Trouble falling or staying asleep, or sleeping too much?     Feeling tired or having little energy?     Poor appetite or overeating?     Feeling bad about yourself - or that you are a failure or have let yourself or your family down?     Trouble concentrating on things, such as reading the newspaper or watching television?     Moving or speaking so slowly that other people could have noticed? Or the opposite - being so " fidgety or restless that you have been moving around a lot more than usual?     Thoughts that you would be better off dead, or of hurting yourself in some way?     PHQ-9 Total Score     If you checked off any problems, how difficult have these problems made it for you to do your work, take care of things at home, or get along with other people?             Result Review  Data Reviewed:                   Assessment and Plan      Diagnoses and all orders for this visit:    1. Vertigo (Primary)  Assessment & Plan:  Trouble walking due to vertigo   tm either om vs cholesteatoma    will treat and see in 2 weeks get ct mastoid /tm if no better and ent referral      2. Recurrent suppurative otitis media of left ear without spontaneous rupture of tympanic membrane  Assessment & Plan:  Had a problem back in the 60 with eardrum    Orders:  -     cefuroxime (CEFTIN) 500 MG tablet; Take 1 tablet by mouth 2 (Two) Times a Day.  Dispense: 20 tablet; Refill: 0  -     dexAMETHasone (DECADRON) injection 8 mg            Follow Up   No follow-ups on file.  Patient was given instructions and counseling regarding his condition or for health maintenance advice. Please see specific information pulled into the AVS if appropriate.

## 2024-12-17 NOTE — PROGRESS NOTES
After obtaining consent, and per orders of NINA Lea,  injection of Decadron 8 mg was given by Linwood Garcia MA and administered to the left ventrogluteal IM. Patient instructed to remain in clinic for 20 minutes afterwards, and to report any adverse reaction to me immediately. Pt tolerated well with no adverse reactions.

## 2024-12-31 ENCOUNTER — OFFICE VISIT (OUTPATIENT)
Dept: FAMILY MEDICINE CLINIC | Facility: CLINIC | Age: 79
End: 2024-12-31
Payer: MEDICARE

## 2024-12-31 VITALS
DIASTOLIC BLOOD PRESSURE: 78 MMHG | SYSTOLIC BLOOD PRESSURE: 168 MMHG | WEIGHT: 149 LBS | HEIGHT: 68 IN | OXYGEN SATURATION: 100 % | HEART RATE: 77 BPM | BODY MASS INDEX: 22.58 KG/M2

## 2024-12-31 DIAGNOSIS — H81.12 BPV (BENIGN POSITIONAL VERTIGO), LEFT: Primary | ICD-10-CM

## 2024-12-31 PROCEDURE — 1126F AMNT PAIN NOTED NONE PRSNT: CPT | Performed by: PEDIATRICS

## 2024-12-31 PROCEDURE — 99213 OFFICE O/P EST LOW 20 MIN: CPT | Performed by: PEDIATRICS

## 2024-12-31 PROCEDURE — 3078F DIAST BP <80 MM HG: CPT | Performed by: PEDIATRICS

## 2024-12-31 PROCEDURE — 3077F SYST BP >= 140 MM HG: CPT | Performed by: PEDIATRICS

## 2024-12-31 PROCEDURE — G2211 COMPLEX E/M VISIT ADD ON: HCPCS | Performed by: PEDIATRICS

## 2024-12-31 NOTE — ASSESSMENT & PLAN NOTE
Has gotten so much better    he is happy   the ear drum looks the same  will hold on the further testing for now

## 2024-12-31 NOTE — PROGRESS NOTES
"Chief Complaint  Dizziness    Subjective    History of Present Illness      Patient presents to [facility] for   History of Present Illness  Pt is here today for 2 week f/u for Vertigo. He reports an improvement in vertigo since last visit. Seems to be doing well overall.        Review of Systems    I have reviewed and agree with the HPI information as above.  Avinash Nayak MD     Objective   Vital Signs:   /78   Pulse 77   Ht 172.7 cm (68\")   Wt 67.6 kg (149 lb)   SpO2 100%   BMI 22.66 kg/m²     BMI is within normal parameters. No other follow-up for BMI required.      Physical Exam  Constitutional:       Appearance: Normal appearance. He is normal weight.   HENT:      Right Ear: Tympanic membrane normal.      Ears:        Comments: Yellow collection behind eardrum   ? Inf vs cholestetoma  Cardiovascular:      Rate and Rhythm: Normal rate and regular rhythm.      Heart sounds: Normal heart sounds.   Pulmonary:      Effort: Pulmonary effort is normal.      Breath sounds: Normal breath sounds.   Neurological:      Mental Status: He is alert.   Psychiatric:         Mood and Affect: Mood normal.         Behavior: Behavior normal.                  Result Review  Data Reviewed:                   Assessment and Plan      Diagnoses and all orders for this visit:    1. BPV (benign positional vertigo), left (Primary)  Assessment & Plan:  Has gotten so much better    he is happy   the ear drum looks the same  will hold on the further testing for now              Follow Up   Return for Next scheduled follow up.  Patient was given instructions and counseling regarding his condition or for health maintenance advice. Please see specific information pulled into the AVS if appropriate.       "

## 2025-08-03 DIAGNOSIS — I10 ESSENTIAL HYPERTENSION: ICD-10-CM

## 2025-08-04 RX ORDER — LISINOPRIL 10 MG/1
10 TABLET ORAL DAILY
Qty: 90 TABLET | Refills: 3 | OUTPATIENT
Start: 2025-08-04

## (undated) DEVICE — ENDO KIT,LOURDES HOSPITAL: Brand: MEDLINE INDUSTRIES, INC.